# Patient Record
Sex: MALE | Race: OTHER | NOT HISPANIC OR LATINO | Employment: OTHER | URBAN - METROPOLITAN AREA
[De-identification: names, ages, dates, MRNs, and addresses within clinical notes are randomized per-mention and may not be internally consistent; named-entity substitution may affect disease eponyms.]

---

## 2017-01-01 ENCOUNTER — TELEPHONE (OUTPATIENT)
Dept: MEDICAL GROUP | Age: 82
End: 2017-01-01

## 2017-01-01 ENCOUNTER — HOSPITAL ENCOUNTER (OUTPATIENT)
Dept: LAB | Facility: MEDICAL CENTER | Age: 82
End: 2017-08-07
Attending: INTERNAL MEDICINE
Payer: MEDICARE

## 2017-01-01 ENCOUNTER — OFFICE VISIT (OUTPATIENT)
Dept: MEDICAL GROUP | Age: 82
End: 2017-01-01
Payer: MEDICARE

## 2017-01-01 ENCOUNTER — OFFICE VISIT (OUTPATIENT)
Dept: URGENT CARE | Facility: CLINIC | Age: 82
End: 2017-01-01
Payer: MEDICARE

## 2017-01-01 ENCOUNTER — HOSPITAL ENCOUNTER (OUTPATIENT)
Dept: RADIOLOGY | Facility: MEDICAL CENTER | Age: 82
End: 2017-11-16
Attending: INTERNAL MEDICINE
Payer: MEDICARE

## 2017-01-01 ENCOUNTER — HOSPITAL ENCOUNTER (OUTPATIENT)
Dept: LAB | Facility: MEDICAL CENTER | Age: 82
End: 2017-11-14
Attending: INTERNAL MEDICINE
Payer: MEDICARE

## 2017-01-01 ENCOUNTER — HOSPITAL ENCOUNTER (OUTPATIENT)
Dept: RADIOLOGY | Facility: MEDICAL CENTER | Age: 82
End: 2017-08-09
Attending: INTERNAL MEDICINE
Payer: MEDICARE

## 2017-01-01 VITALS
SYSTOLIC BLOOD PRESSURE: 150 MMHG | WEIGHT: 141.6 LBS | HEART RATE: 82 BPM | HEIGHT: 65 IN | TEMPERATURE: 98.1 F | DIASTOLIC BLOOD PRESSURE: 82 MMHG | OXYGEN SATURATION: 95 % | BODY MASS INDEX: 23.59 KG/M2

## 2017-01-01 VITALS
HEIGHT: 65 IN | RESPIRATION RATE: 18 BRPM | DIASTOLIC BLOOD PRESSURE: 72 MMHG | TEMPERATURE: 97.4 F | BODY MASS INDEX: 23.49 KG/M2 | WEIGHT: 141 LBS | OXYGEN SATURATION: 97 % | SYSTOLIC BLOOD PRESSURE: 126 MMHG | HEART RATE: 60 BPM

## 2017-01-01 VITALS
OXYGEN SATURATION: 95 % | HEIGHT: 65 IN | TEMPERATURE: 97.3 F | DIASTOLIC BLOOD PRESSURE: 78 MMHG | SYSTOLIC BLOOD PRESSURE: 130 MMHG | HEART RATE: 72 BPM | WEIGHT: 139.8 LBS | BODY MASS INDEX: 23.29 KG/M2

## 2017-01-01 DIAGNOSIS — K21.9 GASTROESOPHAGEAL REFLUX DISEASE WITHOUT ESOPHAGITIS: ICD-10-CM

## 2017-01-01 DIAGNOSIS — I10 HTN (HYPERTENSION), BENIGN: ICD-10-CM

## 2017-01-01 DIAGNOSIS — M15.9 PRIMARY OSTEOARTHRITIS INVOLVING MULTIPLE JOINTS: ICD-10-CM

## 2017-01-01 DIAGNOSIS — G25.81 RLS (RESTLESS LEGS SYNDROME): ICD-10-CM

## 2017-01-01 DIAGNOSIS — S99.921A INJURY OF TOE ON RIGHT FOOT, INITIAL ENCOUNTER: ICD-10-CM

## 2017-01-01 DIAGNOSIS — R63.4 WEIGHT LOSS: ICD-10-CM

## 2017-01-01 DIAGNOSIS — F02.80 DEMENTIA ASSOCIATED WITH OTHER UNDERLYING DISEASE WITHOUT BEHAVIORAL DISTURBANCE (HCC): ICD-10-CM

## 2017-01-01 DIAGNOSIS — E78.00 PURE HYPERCHOLESTEROLEMIA: ICD-10-CM

## 2017-01-01 DIAGNOSIS — C34.91 NON-SMALL CELL CARCINOMA OF LUNG, RIGHT (HCC): ICD-10-CM

## 2017-01-01 DIAGNOSIS — Z23 NEED FOR VACCINATION: ICD-10-CM

## 2017-01-01 DIAGNOSIS — C34.91 PRIMARY LUNG CANCER WITH METASTASIS FROM LUNG TO OTHER SITE, RIGHT (HCC): ICD-10-CM

## 2017-01-01 DIAGNOSIS — F01.50 VASCULAR DEMENTIA WITHOUT BEHAVIORAL DISTURBANCE (HCC): ICD-10-CM

## 2017-01-01 LAB
ALBUMIN SERPL BCP-MCNC: 3.9 G/DL (ref 3.2–4.9)
ALBUMIN SERPL BCP-MCNC: 4.1 G/DL (ref 3.2–4.9)
ALBUMIN/GLOB SERPL: 1.3 G/DL
ALBUMIN/GLOB SERPL: 1.3 G/DL
ALP SERPL-CCNC: 76 U/L (ref 30–99)
ALP SERPL-CCNC: 81 U/L (ref 30–99)
ALT SERPL-CCNC: 21 U/L (ref 2–50)
ALT SERPL-CCNC: 25 U/L (ref 2–50)
ANION GAP SERPL CALC-SCNC: 7 MMOL/L (ref 0–11.9)
ANION GAP SERPL CALC-SCNC: 9 MMOL/L (ref 0–11.9)
AST SERPL-CCNC: 23 U/L (ref 12–45)
AST SERPL-CCNC: 28 U/L (ref 12–45)
BASOPHILS # BLD AUTO: 0.4 % (ref 0–1.8)
BASOPHILS # BLD: 0.03 K/UL (ref 0–0.12)
BILIRUB SERPL-MCNC: 0.7 MG/DL (ref 0.1–1.5)
BILIRUB SERPL-MCNC: 0.9 MG/DL (ref 0.1–1.5)
BUN SERPL-MCNC: 25 MG/DL (ref 8–22)
BUN SERPL-MCNC: 27 MG/DL (ref 8–22)
CALCIUM SERPL-MCNC: 8.9 MG/DL (ref 8.5–10.5)
CALCIUM SERPL-MCNC: 9.5 MG/DL (ref 8.5–10.5)
CHLORIDE SERPL-SCNC: 100 MMOL/L (ref 96–112)
CHLORIDE SERPL-SCNC: 103 MMOL/L (ref 96–112)
CO2 SERPL-SCNC: 25 MMOL/L (ref 20–33)
CO2 SERPL-SCNC: 31 MMOL/L (ref 20–33)
CREAT SERPL-MCNC: 1.02 MG/DL (ref 0.5–1.4)
CREAT SERPL-MCNC: 1.1 MG/DL (ref 0.5–1.4)
EOSINOPHIL # BLD AUTO: 0.03 K/UL (ref 0–0.51)
EOSINOPHIL NFR BLD: 0.4 % (ref 0–6.9)
ERYTHROCYTE [DISTWIDTH] IN BLOOD BY AUTOMATED COUNT: 43.8 FL (ref 35.9–50)
GFR SERPL CREATININE-BSD FRML MDRD: >60 ML/MIN/1.73 M 2
GFR SERPL CREATININE-BSD FRML MDRD: >60 ML/MIN/1.73 M 2
GLOBULIN SER CALC-MCNC: 3.1 G/DL (ref 1.9–3.5)
GLOBULIN SER CALC-MCNC: 3.1 G/DL (ref 1.9–3.5)
GLUCOSE SERPL-MCNC: 120 MG/DL (ref 65–99)
GLUCOSE SERPL-MCNC: 133 MG/DL (ref 65–99)
HCT VFR BLD AUTO: 42.4 % (ref 42–52)
HGB BLD-MCNC: 13.5 G/DL (ref 14–18)
IMM GRANULOCYTES # BLD AUTO: 0.08 K/UL (ref 0–0.11)
IMM GRANULOCYTES NFR BLD AUTO: 1 % (ref 0–0.9)
LYMPHOCYTES # BLD AUTO: 1.62 K/UL (ref 1–4.8)
LYMPHOCYTES NFR BLD: 20.3 % (ref 22–41)
MCH RBC QN AUTO: 26.8 PG (ref 27–33)
MCHC RBC AUTO-ENTMCNC: 31.8 G/DL (ref 33.7–35.3)
MCV RBC AUTO: 84.3 FL (ref 81.4–97.8)
MONOCYTES # BLD AUTO: 0.86 K/UL (ref 0–0.85)
MONOCYTES NFR BLD AUTO: 10.8 % (ref 0–13.4)
NEUTROPHILS # BLD AUTO: 5.38 K/UL (ref 1.82–7.42)
NEUTROPHILS NFR BLD: 67.1 % (ref 44–72)
NRBC # BLD AUTO: 0 K/UL
NRBC BLD AUTO-RTO: 0 /100 WBC
PLATELET # BLD AUTO: 176 K/UL (ref 164–446)
PMV BLD AUTO: 12.3 FL (ref 9–12.9)
POTASSIUM SERPL-SCNC: 3.2 MMOL/L (ref 3.6–5.5)
POTASSIUM SERPL-SCNC: 3.6 MMOL/L (ref 3.6–5.5)
PROT SERPL-MCNC: 7 G/DL (ref 6–8.2)
PROT SERPL-MCNC: 7.2 G/DL (ref 6–8.2)
RBC # BLD AUTO: 5.03 M/UL (ref 4.7–6.1)
SODIUM SERPL-SCNC: 137 MMOL/L (ref 135–145)
SODIUM SERPL-SCNC: 138 MMOL/L (ref 135–145)
WBC # BLD AUTO: 8 K/UL (ref 4.8–10.8)

## 2017-01-01 PROCEDURE — 36415 COLL VENOUS BLD VENIPUNCTURE: CPT

## 2017-01-01 PROCEDURE — 80053 COMPREHEN METABOLIC PANEL: CPT

## 2017-01-01 PROCEDURE — G0008 ADMIN INFLUENZA VIRUS VAC: HCPCS | Performed by: FAMILY MEDICINE

## 2017-01-01 PROCEDURE — 700117 HCHG RX CONTRAST REV CODE 255: Performed by: INTERNAL MEDICINE

## 2017-01-01 PROCEDURE — 85025 COMPLETE CBC W/AUTO DIFF WBC: CPT

## 2017-01-01 PROCEDURE — 71260 CT THORAX DX C+: CPT

## 2017-01-01 PROCEDURE — 99214 OFFICE O/P EST MOD 30 MIN: CPT | Performed by: FAMILY MEDICINE

## 2017-01-01 PROCEDURE — 74160 CT ABDOMEN W/CONTRAST: CPT

## 2017-01-01 PROCEDURE — 90662 IIV NO PRSV INCREASED AG IM: CPT | Performed by: FAMILY MEDICINE

## 2017-01-01 PROCEDURE — 99214 OFFICE O/P EST MOD 30 MIN: CPT | Mod: 25 | Performed by: FAMILY MEDICINE

## 2017-01-01 RX ORDER — DILTIAZEM HYDROCHLORIDE 180 MG/1
180 CAPSULE, COATED, EXTENDED RELEASE ORAL DAILY
Qty: 90 CAP | Refills: 0 | Status: SHIPPED | OUTPATIENT
Start: 2017-01-01 | End: 2017-01-01 | Stop reason: SDUPTHER

## 2017-01-01 RX ORDER — DIAZEPAM 5 MG/1
TABLET ORAL
Qty: 30 TAB | Refills: 0 | Status: SHIPPED
Start: 2017-01-01 | End: 2017-01-01 | Stop reason: SDUPTHER

## 2017-01-01 RX ORDER — GABAPENTIN 300 MG/1
300 CAPSULE ORAL 3 TIMES DAILY
Qty: 270 CAP | Refills: 0 | Status: SHIPPED | OUTPATIENT
Start: 2017-01-01 | End: 2018-01-01 | Stop reason: SDUPTHER

## 2017-01-01 RX ORDER — PANTOPRAZOLE SODIUM 40 MG/1
TABLET, DELAYED RELEASE ORAL
Qty: 90 TAB | Refills: 3 | Status: SHIPPED | OUTPATIENT
Start: 2017-01-01

## 2017-01-01 RX ORDER — GABAPENTIN 300 MG/1
CAPSULE ORAL
Qty: 270 CAP | Refills: 0 | Status: SHIPPED | OUTPATIENT
Start: 2017-01-01 | End: 2017-01-01 | Stop reason: SDUPTHER

## 2017-01-01 RX ORDER — EZETIMIBE 10 MG/1
10 TABLET ORAL DAILY
Qty: 90 TAB | Refills: 0 | Status: SHIPPED | OUTPATIENT
Start: 2017-01-01 | End: 2017-01-01 | Stop reason: SDUPTHER

## 2017-01-01 RX ORDER — DILTIAZEM HYDROCHLORIDE 180 MG/1
CAPSULE, EXTENDED RELEASE ORAL
Qty: 90 CAP | Refills: 0 | Status: SHIPPED | OUTPATIENT
Start: 2017-01-01

## 2017-01-01 RX ORDER — HYDROCHLOROTHIAZIDE 12.5 MG/1
CAPSULE, GELATIN COATED ORAL
Qty: 90 CAP | Refills: 0 | Status: SHIPPED | OUTPATIENT
Start: 2017-01-01 | End: 2018-01-01 | Stop reason: SDUPTHER

## 2017-01-01 RX ORDER — MEMANTINE HYDROCHLORIDE 5 MG/1
5 TABLET ORAL DAILY
Qty: 90 TAB | Refills: 0 | Status: SHIPPED | OUTPATIENT
Start: 2017-01-01 | End: 2017-01-01 | Stop reason: SDUPTHER

## 2017-01-01 RX ORDER — POLYETHYLENE GLYCOL 3350 17 G/17G
POWDER, FOR SOLUTION ORAL
Qty: 90 EACH | Refills: 0 | Status: SHIPPED | OUTPATIENT
Start: 2017-01-01

## 2017-01-01 RX ORDER — PANTOPRAZOLE SODIUM 40 MG/1
TABLET, DELAYED RELEASE ORAL
Qty: 90 TAB | Refills: 3 | Status: SHIPPED | OUTPATIENT
Start: 2017-01-01 | End: 2017-01-01 | Stop reason: SDUPTHER

## 2017-01-01 RX ORDER — ROPINIROLE 1 MG/1
3 TABLET, FILM COATED ORAL
Qty: 270 TAB | Refills: 0 | Status: SHIPPED | OUTPATIENT
Start: 2017-01-01 | End: 2018-01-01 | Stop reason: SDUPTHER

## 2017-01-01 RX ORDER — FLUTICASONE PROPIONATE 50 MCG
SPRAY, SUSPENSION (ML) NASAL
Qty: 1 BOTTLE | Refills: 0 | Status: SHIPPED | OUTPATIENT
Start: 2017-01-01

## 2017-01-01 RX ORDER — EZETIMIBE 10 MG/1
10 TABLET ORAL DAILY
Qty: 90 TAB | Refills: 0 | Status: SHIPPED | OUTPATIENT
Start: 2017-01-01 | End: 2018-01-01 | Stop reason: SDUPTHER

## 2017-01-01 RX ORDER — DONEPEZIL HYDROCHLORIDE 5 MG/1
TABLET, FILM COATED ORAL
Qty: 90 TAB | Refills: 0 | Status: SHIPPED | OUTPATIENT
Start: 2017-01-01 | End: 2017-01-01

## 2017-01-01 RX ORDER — SULFAMETHOXAZOLE AND TRIMETHOPRIM 800; 160 MG/1; MG/1
1 TABLET ORAL EVERY 12 HOURS
Qty: 14 TAB | Refills: 0 | Status: SHIPPED | OUTPATIENT
Start: 2017-01-01 | End: 2017-01-01

## 2017-01-01 RX ORDER — POLYETHYLENE GLYCOL 3350 17 G/17G
POWDER, FOR SOLUTION ORAL
Qty: 90 EACH | Refills: 0 | Status: SHIPPED | OUTPATIENT
Start: 2017-01-01 | End: 2017-01-01 | Stop reason: SDUPTHER

## 2017-01-01 RX ORDER — DIAZEPAM 5 MG/1
TABLET ORAL
Qty: 30 TAB | Refills: 0 | Status: SHIPPED
Start: 2017-01-01 | End: 2018-01-01 | Stop reason: SDUPTHER

## 2017-01-01 RX ORDER — MEMANTINE HYDROCHLORIDE 5 MG/1
5 TABLET ORAL DAILY
Qty: 90 TAB | Refills: 0 | Status: SHIPPED | OUTPATIENT
Start: 2017-01-01 | End: 2018-01-01 | Stop reason: SDUPTHER

## 2017-01-01 RX ADMIN — IOHEXOL 100 ML: 350 INJECTION, SOLUTION INTRAVENOUS at 15:14

## 2017-01-01 RX ADMIN — IOHEXOL 100 ML: 350 INJECTION, SOLUTION INTRAVENOUS at 13:45

## 2017-01-01 RX ADMIN — IOHEXOL 50 ML: 240 INJECTION, SOLUTION INTRATHECAL; INTRAVASCULAR; INTRAVENOUS; ORAL at 13:45

## 2017-01-01 ASSESSMENT — ENCOUNTER SYMPTOMS
FOCAL WEAKNESS: 0
FEVER: 0
CHILLS: 0
DIZZINESS: 0

## 2017-01-04 ENCOUNTER — HOSPITAL ENCOUNTER (OUTPATIENT)
Dept: LAB | Facility: MEDICAL CENTER | Age: 82
End: 2017-01-04
Attending: FAMILY MEDICINE
Payer: MEDICARE

## 2017-01-04 DIAGNOSIS — D64.9 ANEMIA, UNSPECIFIED TYPE: ICD-10-CM

## 2017-01-04 LAB
ALBUMIN SERPL BCP-MCNC: 4.2 G/DL (ref 3.2–4.9)
ALBUMIN/GLOB SERPL: 1.3 G/DL
ALP SERPL-CCNC: 79 U/L (ref 30–99)
ALT SERPL-CCNC: 17 U/L (ref 2–50)
ANION GAP SERPL CALC-SCNC: 11 MMOL/L (ref 0–11.9)
AST SERPL-CCNC: 22 U/L (ref 12–45)
BILIRUB SERPL-MCNC: 1.1 MG/DL (ref 0.1–1.5)
BUN SERPL-MCNC: 26 MG/DL (ref 8–22)
CALCIUM SERPL-MCNC: 9.5 MG/DL (ref 8.5–10.5)
CHLORIDE SERPL-SCNC: 100 MMOL/L (ref 96–112)
CO2 SERPL-SCNC: 28 MMOL/L (ref 20–33)
CREAT SERPL-MCNC: 1.14 MG/DL (ref 0.5–1.4)
GLOBULIN SER CALC-MCNC: 3.2 G/DL (ref 1.9–3.5)
GLUCOSE SERPL-MCNC: 89 MG/DL (ref 65–99)
POTASSIUM SERPL-SCNC: 3.8 MMOL/L (ref 3.6–5.5)
PROT SERPL-MCNC: 7.4 G/DL (ref 6–8.2)
SODIUM SERPL-SCNC: 139 MMOL/L (ref 135–145)

## 2017-01-04 PROCEDURE — 36415 COLL VENOUS BLD VENIPUNCTURE: CPT

## 2017-01-04 PROCEDURE — 80053 COMPREHEN METABOLIC PANEL: CPT

## 2017-01-10 ENCOUNTER — OFFICE VISIT (OUTPATIENT)
Dept: MEDICAL GROUP | Age: 82
End: 2017-01-10
Payer: MEDICARE

## 2017-01-10 VITALS
OXYGEN SATURATION: 93 % | BODY MASS INDEX: 23.69 KG/M2 | WEIGHT: 142.2 LBS | TEMPERATURE: 97.5 F | DIASTOLIC BLOOD PRESSURE: 72 MMHG | SYSTOLIC BLOOD PRESSURE: 130 MMHG | HEART RATE: 83 BPM | HEIGHT: 65 IN

## 2017-01-10 DIAGNOSIS — F02.80 DEMENTIA ASSOCIATED WITH OTHER UNDERLYING DISEASE WITHOUT BEHAVIORAL DISTURBANCE (HCC): ICD-10-CM

## 2017-01-10 DIAGNOSIS — R25.2 CRAMP IN LOWER LEG: ICD-10-CM

## 2017-01-10 DIAGNOSIS — R05.9 COUGH: ICD-10-CM

## 2017-01-10 DIAGNOSIS — G31.84 MILD COGNITIVE IMPAIRMENT: ICD-10-CM

## 2017-01-10 DIAGNOSIS — I10 HTN (HYPERTENSION), BENIGN: ICD-10-CM

## 2017-01-10 DIAGNOSIS — R20.2 PARESTHESIAS: ICD-10-CM

## 2017-01-10 DIAGNOSIS — G25.81 RLS (RESTLESS LEGS SYNDROME): ICD-10-CM

## 2017-01-10 DIAGNOSIS — K21.9 GASTROESOPHAGEAL REFLUX DISEASE WITHOUT ESOPHAGITIS: ICD-10-CM

## 2017-01-10 PROCEDURE — 99214 OFFICE O/P EST MOD 30 MIN: CPT | Performed by: FAMILY MEDICINE

## 2017-01-10 NOTE — ASSESSMENT & PLAN NOTE
Patient has been stable on Protonix 40 mg by mouth daily. Patient denies any difficulty swallowing, no regurgitation, no eructation, no weight loss, no nocturnal asthma no food getting stuck in the chest.

## 2017-01-10 NOTE — ASSESSMENT & PLAN NOTE
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 His daughter states that he tends to just forget things all the time but it's not too severe.

## 2017-01-10 NOTE — TELEPHONE ENCOUNTER
Was the patient seen in the last year in this department? Yes     Does patient have an active prescription for medications requested? No     Received Request Via: Patient       Patient taking ranitidine 150 mg and tria/hctz 37.5-25mg but they are not on med list.

## 2017-01-10 NOTE — ASSESSMENT & PLAN NOTE
The patient states he's been coughing for about a week now, there is been no sputum production. Denies any generalized malaise, no changes in appetite no headaches no nasal congestion, no runny nose. He is able to tolerate by mouth.

## 2017-01-10 NOTE — ASSESSMENT & PLAN NOTE
Patient has been taking hydrochlorothiazide 12.5 mg, diltiazem 180 mg and tolerating medications just fine.The patient has been tollerating the BP meds without any issues. No tunnel vision, no cough, no changes in vision, no lightheadedness, no fatigue, no syncopal or presyncopal episodes, no edema, no new rashes.

## 2017-01-10 NOTE — PROGRESS NOTES
This medical record contains text that has been entered with the assistance of computer voice recognition and dictation software.  Therefore, it may contain unintended errors in text, spelling, punctuation, or grammar    Chief Complaint   Patient presents with   • Cough     x 2-3 wks   • Hypertension     med refill all meds/leaving to [facility] Feb 7       Benjie Gardiner is a 85 y.o. 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 His daughter states that he tends to just forget things all the time but it's not too severe.    HTN (hypertension), benign  Patient has been taking hydrochlorothiazide 12.5 mg, diltiazem 180 mg and tolerating medications just fine.The patient has been tollerating the BP meds without any issues. No tunnel vision, no cough, no changes in vision, no lightheadedness, no fatigue, no syncopal or presyncopal episodes, no edema, no new rashes.     Gastroesophageal reflux disease without esophagitis  Patient has been stable on Protonix 40 mg by mouth daily. Patient denies any difficulty swallowing, no regurgitation, no eructation, no weight loss, no nocturnal asthma no food getting stuck in the chest.    Cough  The patient states he's been coughing for about a week now, there is been no sputum production. Denies any generalized malaise, no changes in appetite no headaches no nasal congestion, no runny nose. He is able to tolerate by mouth.      Current medicines (including changes today)  Current Outpatient Prescriptions   Medication Sig Dispense Refill   • pantoprazole (PROTONIX) 40 MG Tablet Delayed Response TAKE ONE TABLET BY MOUTH ONCE DAILY 90 Tab 0   • polyethylene glycol/lytes (MIRALAX) Pack DISSOLVE ONE PACKET IN WATER AND DRINK ONCE DAILY AS NEEDED FOR CONSTIPATION 28 Each 0   • diltiazem (DILACOR XR) 180 MG XR capsule TAKE ONE CAPSULE  BY MOUTH ONCE DAILY 30 Cap 0   • albuterol 108 (90 BASE) MCG/ACT Aero Soln inhalation aerosol Inhale 2 Puffs by mouth every 6 hours as needed for Shortness of Breath. 8.5 g 3   • fluticasone (FLONASE) 50 MCG/ACT nasal spray Spray 2 Sprays in nose 2 times a day. 1 Bottle 3   • benzonatate (TESSALON) 100 MG Cap Take 1 Cap by mouth 3 times a day as needed for Cough. 60 Cap 3   • donepezil (ARICEPT) 5 MG Tab Take 1 Tab by mouth every day. 30 Tab 0   • chlorhexidine (PERIDEX) 0.12 % Solution Take 15 mL by mouth 2 times a day. 946 mL 0   • Esomeprazole Magnesium (NEXIUM 24HR) 20 MG Tablet Delayed Response Take  by mouth.     • clotrimazole-betamethasone (LOTRISONE) 1-0.05 % Cream Apply 1 Application to affected area(s) 2 times a day.     • doxycycline (VIBRAMYCIN) 100 MG Tab Take 100 mg by mouth 2 times a day. Continuous.     • Erlotinib HCl (TARCEVA) 100 MG Tab Take 100 mg by mouth every evening.     • tamsulosin (FLOMAX) 0.4 MG capsule Take 0.4 mg by mouth ONE-HALF HOUR AFTER BREAKFAST.     • ezetimibe (ZETIA) 10 MG Tab Take 10 mg by mouth every day.     • diltiazem CD (CARDIZEM CD) 180 MG CAPSULE SR 24 HR Take 180 mg by mouth every day.     • hydrochlorothiazide (MICROZIDE) 12.5 MG capsule Take 12.5 mg by mouth every day.     • celecoxib (CELEBREX) 200 MG Cap Take 200 mg by mouth 1 time daily as needed for Moderate Pain.     • clindamycin (CLEOCIN T) 1 % Gel Apply 1 Application to affected area(s) 2 times a day.     • ferrous sulfate 325 (65 FE) MG tablet Take 325 mg by mouth every day.     • ropinirole (REQUIP) 1 MG Tab Take 3 Tabs by mouth every bedtime. 270 Tab 3   • latanoprost (XALATAN) 0.005 % Solution Place 1 Drop in both eyes every evening.     • gabapentin (NEURONTIN) 300 MG Cap Take 1 Cap by mouth 3 times a day. 270 Cap 0   • pantoprazole (PROTONIX) 40 MG Tablet Delayed Response Take 1 Tab by mouth every day. 90 Tab 0   • diazepam (VALIUM) 5 MG Tab Take 0.5 Tabs by mouth every 6 hours as needed (muscle  cramps). 20 Tab 0   • hydrocodone-acetaminophen (NORCO) 5-325 MG Tab per tablet Take 1-2 Tabs by mouth every four hours as needed. Indications: Moderate to Moderately Severe Pain     • Multiple Vitamins-Minerals (MULTIVITAMIN PO) Take 1 Tab by mouth every day.     • vitamin D (CHOLECALCIFEROL) 1000 UNIT TABS Take 1,000 Units by mouth every day.       No current facility-administered medications for this visit.     He  has a past medical history of HTN (hypertension), benign; Mild cognitive impairment; Hyperlipidemia; Paresthesias; Depression; Anxiety; DJD (degenerative joint disease); MEDICAL HOME (2012); Hypertension; Heart burn; Indigestion; Glaucoma; Unspecified cataract; Dementia; Arthritis; Carcinoma in situ of respiratory system; and Cancer (HCC) ().  He  has past surgical history that includes cataract extraction (Bilateral, ); other (Left, ); hemorrhoidectomy (08); prostatectomy, radial (); prostatectomy, radical retro; recovery (3/13/2015); colonoscopy - endo (N/A, 2016); and penetrating keratoplasty (Left, 2016).  Social History   Substance Use Topics   • Smoking status: Never Smoker    • Smokeless tobacco: Never Used   • Alcohol Use: No     Social History     Social History Narrative    No known exposure to asbestos, dyes, chemicals, or pesticides.   for 53 years.  Moved to  in .  2 daughters and 1 son.  1 daughter passed away from breast cancer.      Family History   Problem Relation Age of Onset   • Cancer Daughter 49     Breast     Family Status   Relation Status Death Age   • Daughter  50   • Mother     • Father     • Sister     • Maternal Grandmother     • Maternal Grandfather     • Paternal Grandmother     • Paternal Grandfather           ROS  Please see hpi    All other systems reviewed and are negative     Objective:     Blood pressure 130/72, pulse 83, temperature 36.4 °C (97.5 °F),  "height 1.651 m (5' 5\"), weight 64.501 kg (142 lb 3.2 oz), SpO2 93 %. Body mass index is 23.66 kg/(m^2).  Physical Exam:    Constitutional: Alert, no distress.  Skin: Warm, dry, good turgor, no rashes in visible areas.  Eye: Equal, round and reactive, conjunctiva clear, lids normal.  ENMT: Lips without lesions, good dentition, oropharynx clear.  Neck: Trachea midline, no masses, no thyromegaly. No cervical or supraclavicular lymphadenopathy.  Respiratory: Unlabored respiratory effort, lungs clear to auscultation, no wheezes, no ronchi.  Cardiovascular: Normal S1, S2, no murmur, no edema.  Abdomen: Soft, non-tender, no masses, no hepatosplenomegaly.  Psych: Alert and oriented x3, normal affect and mood.          Assessment and Plan:   The following treatment plan was discussed, again this medical record contains text that has been entered with the assistance of computer voice recognition and dictation software.  Therefore, it may contain unintended errors in text, spelling, punctuation, or grammar    1. Cough    The patient is nontoxic in appearance  Also hemodynamically stable  There is no clinical indication for antibiotics or imaging  We will proceed with conservative management      2. Dementia associated with other underlying disease without behavioral disturbance  Patient has been stable with current management  We will make no changes for now      3. HTN (hypertension), benign  Patient has been stable with current management  We will make no changes for now      4. Gastroesophageal reflux disease without esophagitis  Patient has been stable with current management  We will make no changes for now          Followup: Return in about 3 months (around 4/10/2017) for Reevaluation.           "

## 2017-01-10 NOTE — MR AVS SNAPSHOT
"        Benjie Gardiner   1/10/2017 9:40 AM   Office Visit   MRN: [id number]    Department:  [address] [place] [address]   Dept Phone:  [phone]    Description:  Male : 3/1/1931   Provider:  Asher Garza M.D.           Reason for Visit     Cough x 2-3 wks    Hypertension med refill all meds/leaving to [facility]       Allergies as of 1/10/2017     Allergen Noted Reactions    Lipitor [Atorvastatin Calcium] 2009       Leg cramps      Vital Signs     Blood Pressure Pulse Temperature Height Weight Body Mass Index    130/72 mmHg 83 36.4 °C (97.5 °F) 1.651 m (5' 5\") 64.501 kg (142 lb 3.2 oz) 23.66 kg/m2    Oxygen Saturation Smoking Status                93% Never Smoker           Basic Information     Date Of Birth Sex Race Ethnicity Preferred Language    3/1/1931 Male Other Non- English      Your appointments     2017  1:00 PM   CT BODY WITH with [facility] CT 2   [facility] [place]    [address]  [facility] [address]   [phone]           Taking medications as regularly scheduled is strongly encouraged.  *For Abdominal CT-Patient needs to  oral contrast and instruction from the department at least 2 hours prior to exam. Patient may  contrast at any imaging facility.              Problem List              ICD-10-CM Priority Class Noted - Resolved    HTN (hypertension), benign I10   Unknown - Present    Mild cognitive impairment G31.84   Unknown - Present    Hyperlipidemia E78.5   Unknown - Present    DJD (degenerative joint disease) M19.90   Unknown - Present    Paresthesias R20.2   Unknown - Present    Pulmonary nodule R91.1   2015 - Present    RLS (restless legs syndrome) G25.81   3/2/2015 - Present    Solitary pulmonary nodule R91.1   3/13/2015 - Present    Lung cancer (HCC) C34.90   2015 - Present    Primary pulmonary hypertension (HCC) I27.0 High  2015 - Present    Gastroesophageal reflux disease without esophagitis " K21.9   12/2/2015 - Present    Chronic rhinitis J31.0   12/2/2015 - Present    Weight loss R63.4   12/2/2015 - Present    Erosive gastritis K29.00   12/10/2015 - Present    Cramp in lower leg R25.2   2/24/2016 - Present    Normocytic anemia D64.9   2/24/2016 - Present    Secondary corneal edema H18.239   4/5/2016 - Present    Left hip pain M25.552   6/27/2016 - Present    Seborrheic dermatitis of scalp L21.9   6/27/2016 - Present    Rash on both feet R21   6/27/2016 - Present    GIB (gastrointestinal bleeding) K92.2   9/8/2016 - Present    SBO (small bowel obstruction) ([facility]) K56.69   9/9/2016 - Present    Dementia F03.90   9/10/2016 - Present    Anemia D64.9   9/22/2016 - Present    Hypocalcemia E83.51   9/22/2016 - Present    ZANDER (acute kidney injury) ([facility]) N17.9   9/22/2016 - Present    Acute nasopharyngitis J00   12/2/2016 - Present      Health Maintenance        Date Due Completion Dates    DIABETES MONOFILAMTENT / LE EXAM 9/1/1931 ---    URINE ACR / MICROALBUMIN 10/29/2014 10/29/2013    A1C SCREENING 12/16/2015 6/16/2015, 12/20/2011    FASTING LIPID PROFILE 1/16/2017 1/16/2016, 9/8/2015, 6/4/2015, 8/26/2014, 8/15/2013, 1/17/2013, 7/2/2012, 10/13/2011, 3/4/2011, 8/25/2010, 3/4/2010, 9/12/2009    RETINAL SCREENING 8/15/2017 8/15/2016    SERUM CREATININE 1/4/2018 1/4/2017, 9/11/2016, 9/10/2016, 9/9/2016, 9/8/2016, 7/21/2016, 6/17/2016, 3/9/2016, 2/18/2016, 2/17/2016, 2/1/2016, 1/27/2016, 1/26/2016, 1/25/2016, 1/16/2016, 9/8/2015, 6/10/2015, 5/13/2015, 5/6/2015, 4/22/2015, 4/14/2015, 4/12/2015, 3/27/2015, 2/20/2015, 10/2/2014, 8/26/2014, 6/6/2014, 5/1/2014, 4/8/2014, 10/29/2013, 8/15/2013, 1/17/2013, 7/2/2012, 9/13/2011, 3/4/2010, 5/1/2008, 6/11/2007    IMM DTaP/Tdap/Td Vaccine (2 - Td) 10/8/2025 10/8/2015            Current Immunizations     13-VALENT PCV PREVNAR 10/8/2015    Influenza TIV (IM) 9/7/2010    Influenza Vaccine Adult HD 10/24/2016, 10/8/2015    Influenza Vaccine Quad Inj (Pf) 8/25/2014, 10/20/2011     Influenza Vaccine Quad Inj (Preserved) 10/8/2013, 9/20/2012    Pneumococcal polysaccharide vaccine (PPSV-23) 9/20/2012    SHINGLES VACCINE 10/8/2015    Tdap Vaccine 10/8/2015      Below and/or attached are the medications your provider expects you to take. Review all of your home medications and newly ordered medications with your provider and/or pharmacist. Follow medication instructions as directed by your provider and/or pharmacist. Please keep your medication list with you and share with your provider. Update the information when medications are discontinued, doses are changed, or new medications (including over-the-counter products) are added; and carry medication information at all times in the event of emergency situations     Allergies:  LIPITOR - (reactions not documented)               Medications  Valid as of: January 10, 2017 - 12:12 PM    Generic Name Brand Name Tablet Size Instructions for use    Albuterol Sulfate (Aero Soln) albuterol 108 (90 BASE) MCG/ACT Inhale 2 Puffs by mouth every 6 hours as needed for Shortness of Breath.        Benzonatate (Cap) TESSALON 100 MG Take 1 Cap by mouth 3 times a day as needed for Cough.        Celecoxib (Cap) CELEBREX 200 MG Take 200 mg by mouth 1 time daily as needed for Moderate Pain.        Chlorhexidine Gluconate (Solution) PERIDEX 0.12 % Take 15 mL by mouth 2 times a day.        Cholecalciferol (Tab) cholecalciferol 1000 UNIT Take 1,000 Units by mouth every day.        Clindamycin Phosphate (Gel) CLEOCIN T 1 % Apply 1 Application to affected area(s) 2 times a day.        Clotrimazole-Betamethasone (Cream) LOTRISONE 1-0.05 % Apply 1 Application to affected area(s) 2 times a day.        DiazePAM (Tab) VALIUM 5 MG Take 0.5 Tabs by mouth every 6 hours as needed (muscle cramps).        DiltiaZEM HCl (CAPSULE SR 24 HR) DILACOR  MG TAKE ONE CAPSULE BY MOUTH ONCE DAILY        DiltiaZEM HCl Coated Beads (CAPSULE SR 24 HR) CARDIZEM  MG Take 180 mg by mouth  every day.        Donepezil HCl (Tab) ARICEPT 5 MG Take 1 Tab by mouth every day.        Doxycycline Hyclate (Tab) VIBRAMYCIN 100 MG Take 100 mg by mouth 2 times a day. Continuous.        Erlotinib HCl (Tab) Erlotinib HCl 100 MG Take 100 mg by mouth every evening.        Esomeprazole Magnesium (Tablet Delayed Response) Esomeprazole Magnesium 20 MG Take  by mouth.        Ezetimibe (Tab) ZETIA 10 MG Take 10 mg by mouth every day.        Ferrous Sulfate (Tab) ferrous sulfate 325 (65 FE) MG Take 325 mg by mouth every day.        Fluticasone Propionate (Suspension) FLONASE 50 MCG/ACT Spray 2 Sprays in nose 2 times a day.        Gabapentin (Cap) NEURONTIN 300 MG Take 1 Cap by mouth 3 times a day.        HydroCHLOROthiazide (Cap) MICROZIDE 12.5 MG Take 12.5 mg by mouth every day.        Hydrocodone-Acetaminophen (Tab) NORCO 5-325 MG Take 1-2 Tabs by mouth every four hours as needed. Indications: Moderate to Moderately Severe Pain        Latanoprost (Solution) XALATAN 0.005 % Place 1 Drop in both eyes every evening.        Multiple Vitamins-Minerals   Take 1 Tab by mouth every day.        Pantoprazole Sodium (Tablet Delayed Response) PROTONIX 40 MG Take 1 Tab by mouth every day.        Pantoprazole Sodium (Tablet Delayed Response) PROTONIX 40 MG TAKE ONE TABLET BY MOUTH ONCE DAILY        Polyethylene Glycol 3350 (Pack) MIRALAX  DISSOLVE ONE PACKET IN WATER AND DRINK ONCE DAILY AS NEEDED FOR CONSTIPATION        ROPINIRole HCl (Tab) REQUIP 1 MG Take 3 Tabs by mouth every bedtime.        Tamsulosin HCl (Cap) FLOMAX 0.4 MG Take 0.4 mg by mouth ONE-HALF HOUR AFTER BREAKFAST.        .                 Medicines prescribed today were sent to:     [facility] [place], [place] - [address]    [address] [facility] [address]    Phone: [phone] Fax: [phone]    Open 24 Hours?: No      Medication refill instructions:       If your prescription bottle indicates you have medication refills left, it is not  necessary to call your provider’s office. Please contact your pharmacy and they will refill your medication.    If your prescription bottle indicates you do not have any refills left, you may request refills at any time through one of the following ways: The online [employer] system (except Urgent Care), by calling your provider’s office, or by asking your pharmacy to contact your provider’s office with a refill request. Medication refills are processed only during regular business hours and may not be available until the next business day. Your provider may request additional information or to have a follow-up visit with you prior to refilling your medication.   *Please Note: Medication refills are assigned a new Rx number when refilled electronically. Your pharmacy may indicate that no refills were authorized even though a new prescription for the same medication is available at the pharmacy. Please request the medicine by name with the pharmacy before contacting your provider for a refill.        Other Notes About Your Plan     This patient has an appointment on 03/14/2016. At the beginning of the year all chronic conditions should be assessed and Documented in conjunction with any other identified concerns during the visit.     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

## 2017-01-11 ENCOUNTER — HOSPITAL ENCOUNTER (OUTPATIENT)
Dept: RADIOLOGY | Facility: MEDICAL CENTER | Age: 82
End: 2017-01-11
Attending: INTERNAL MEDICINE
Payer: MEDICARE

## 2017-01-11 ENCOUNTER — TELEPHONE (OUTPATIENT)
Dept: MEDICAL GROUP | Age: 82
End: 2017-01-11

## 2017-01-11 DIAGNOSIS — C34.91 NON-SMALL CELL CARCINOMA OF LUNG, RIGHT (HCC): ICD-10-CM

## 2017-01-11 PROCEDURE — 71260 CT THORAX DX C+: CPT

## 2017-01-11 PROCEDURE — 700117 HCHG RX CONTRAST REV CODE 255: Performed by: INTERNAL MEDICINE

## 2017-01-11 RX ORDER — DONEPEZIL HYDROCHLORIDE 5 MG/1
5 TABLET, FILM COATED ORAL DAILY
Qty: 90 TAB | Refills: 0 | Status: SHIPPED | OUTPATIENT
Start: 2017-01-11 | End: 2017-04-18 | Stop reason: SDUPTHER

## 2017-01-11 RX ORDER — POLYETHYLENE GLYCOL 3350 17 G/17G
POWDER, FOR SOLUTION ORAL
Qty: 90 EACH | Refills: 0 | Status: SHIPPED | OUTPATIENT
Start: 2017-01-11 | End: 2017-05-19 | Stop reason: SDUPTHER

## 2017-01-11 RX ORDER — DIAZEPAM 5 MG/1
2.5 TABLET ORAL EVERY 6 HOURS PRN
Qty: 60 TAB | Refills: 0 | Status: SHIPPED
Start: 2017-01-11 | End: 2017-01-01 | Stop reason: SDUPTHER

## 2017-01-11 RX ORDER — GABAPENTIN 300 MG/1
300 CAPSULE ORAL 3 TIMES DAILY
Qty: 270 CAP | Refills: 0 | Status: SHIPPED | OUTPATIENT
Start: 2017-01-11 | End: 2017-04-19 | Stop reason: SDUPTHER

## 2017-01-11 RX ORDER — EZETIMIBE 10 MG/1
10 TABLET ORAL DAILY
Qty: 90 TAB | Refills: 0 | Status: SHIPPED | OUTPATIENT
Start: 2017-01-11 | End: 2017-01-01 | Stop reason: SDUPTHER

## 2017-01-11 RX ORDER — PANTOPRAZOLE SODIUM 40 MG/1
TABLET, DELAYED RELEASE ORAL
Qty: 90 TAB | Refills: 0 | Status: SHIPPED | OUTPATIENT
Start: 2017-01-11 | End: 2017-01-01 | Stop reason: SDUPTHER

## 2017-01-11 RX ORDER — ROPINIROLE 1 MG/1
3 TABLET, FILM COATED ORAL
Qty: 270 TAB | Refills: 0 | Status: SHIPPED | OUTPATIENT
Start: 2017-01-11 | End: 2017-01-01 | Stop reason: SDUPTHER

## 2017-01-11 RX ORDER — HYDROCHLOROTHIAZIDE 12.5 MG/1
12.5 CAPSULE, GELATIN COATED ORAL DAILY
Qty: 90 CAP | Refills: 0 | Status: SHIPPED | OUTPATIENT
Start: 2017-01-11 | End: 2017-04-18 | Stop reason: SDUPTHER

## 2017-01-11 RX ADMIN — IOHEXOL 150 ML: 350 INJECTION, SOLUTION INTRAVENOUS at 13:34

## 2017-01-11 NOTE — TELEPHONE ENCOUNTER
Refill done.    Jose Alberto Garza MD  Diplomat, [facility]  [address] [place] [phone]

## 2017-01-11 NOTE — TELEPHONE ENCOUNTER
Patient daughter would like to know if patient should be taking doxycycline, also, for his trip to [place].  Please advise.

## 2017-01-30 ENCOUNTER — RX ONLY (OUTPATIENT)
Age: 82
Setting detail: RX ONLY
End: 2017-01-30

## 2017-02-01 NOTE — TELEPHONE ENCOUNTER
Phone Number Called: [phone] Pt's daughter, Luz Elena    Message: Pt has been notified of Dr. Garza's message.    Left Message for patient to call back: no

## 2017-02-01 NOTE — TELEPHONE ENCOUNTER
Yes he should. They were given enough meds for both.    Jose Alberto Garza MD  [address] [address]

## 2017-04-17 ENCOUNTER — OFFICE VISIT (OUTPATIENT)
Dept: MEDICAL GROUP | Age: 82
End: 2017-04-17
Payer: MEDICARE

## 2017-04-17 VITALS
WEIGHT: 144.8 LBS | HEIGHT: 65 IN | SYSTOLIC BLOOD PRESSURE: 140 MMHG | DIASTOLIC BLOOD PRESSURE: 78 MMHG | BODY MASS INDEX: 24.12 KG/M2 | TEMPERATURE: 96.8 F | OXYGEN SATURATION: 99 % | HEART RATE: 87 BPM

## 2017-04-17 DIAGNOSIS — J40 BRONCHITIS: ICD-10-CM

## 2017-04-17 DIAGNOSIS — C34.00 MALIGNANT NEOPLASM OF HILUS OF LUNG, UNSPECIFIED LATERALITY (HCC): ICD-10-CM

## 2017-04-17 DIAGNOSIS — Z00.00 PREVENTATIVE HEALTH CARE: ICD-10-CM

## 2017-04-17 PROCEDURE — 99214 OFFICE O/P EST MOD 30 MIN: CPT | Performed by: FAMILY MEDICINE

## 2017-04-17 RX ORDER — CLOBETASOL PROPIONATE 0.5 MG/G
CREAM TOPICAL
COMMUNITY
Start: 2017-01-30

## 2017-04-17 RX ORDER — AZITHROMYCIN 250 MG/1
TABLET, FILM COATED ORAL
Qty: 6 TAB | Refills: 0 | Status: SHIPPED | OUTPATIENT
Start: 2017-04-17 | End: 2017-01-01

## 2017-04-17 RX ORDER — TIMOLOL MALEATE 5 MG/ML
SOLUTION/ DROPS OPHTHALMIC
COMMUNITY
Start: 2017-04-16

## 2017-04-17 RX ORDER — ERLOTINIB HYDROCHLORIDE 25 MG/1
TABLET ORAL
COMMUNITY
Start: 2017-01-19

## 2017-04-17 ASSESSMENT — PATIENT HEALTH QUESTIONNAIRE - PHQ9: CLINICAL INTERPRETATION OF PHQ2 SCORE: 0

## 2017-04-17 NOTE — ASSESSMENT & PLAN NOTE
Patient is a very pleasant male who always presents to clinic with his wife and daughter. he is up-to-date on health maintenance practices.

## 2017-04-17 NOTE — ASSESSMENT & PLAN NOTE
The patient states for the past 2 weeks she's been coughing up greenish sputum, having generalized malaise as well as runny nose stuffy frontal sinuses. He's been using over-the-counter remedies with no improvement. He did recently return from [place] about 3 weeks ago. Denies any neck pain no neck stiffness no change in vision no headaches no new rashes, no new joint pain, he is able to tolerate by mouth.

## 2017-04-17 NOTE — ASSESSMENT & PLAN NOTE
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 Franklin next wk

## 2017-04-17 NOTE — MR AVS SNAPSHOT
"        Benjie Rigoberto Gardiner   2017 10:00 AM   Office Visit   MRN: [id number]    Department:  [address] [place] [address]   Dept Phone:  [phone]    Description:  Male : 3/1/1931   Provider:  Asher Garza M.D.           Allergies as of 2017     Allergen Noted Reactions    Lipitor [Atorvastatin Calcium] 2009       Leg cramps      You were diagnosed with     Bronchitis   [107544]       Malignant neoplasm of hilus of lung, unspecified laterality (CMS-HCC)   [332140]       Preventative health care   [759083]         Vital Signs     Blood Pressure Pulse Temperature Height Weight Body Mass Index    140/78 mmHg 87 36 °C (96.8 °F) 1.651 m (5' 5\") 65.681 kg (144 lb 12.8 oz) 24.10 kg/m2    Oxygen Saturation Smoking Status                99% Never Smoker           Basic Information     Date Of Birth Sex Race Ethnicity Preferred Language    3/1/1931 Male Other Non- English      Problem List              ICD-10-CM Priority Class Noted - Resolved    HTN (hypertension), benign I10   Unknown - Present    Mild cognitive impairment G31.84   Unknown - Present    Hyperlipidemia E78.5   Unknown - Present    DJD (degenerative joint disease) M19.90   Unknown - Present    Paresthesias R20.2   Unknown - Present    Pulmonary nodule R91.1   2015 - Present    RLS (restless legs syndrome) G25.81   3/2/2015 - Present    Solitary pulmonary nodule R91.1   3/13/2015 - Present    Lung cancer (CMS-HCC) C34.90   2015 - Present    Primary pulmonary hypertension (CMS-HCC) I27.0 High  2015 - Present    Gastroesophageal reflux disease without esophagitis K21.9   2015 - Present    Chronic rhinitis J31.0   2015 - Present    Weight loss R63.4   2015 - Present    Erosive gastritis K29.00   12/10/2015 - Present    Cramp in lower leg R25.2   2016 - Present    Normocytic anemia D64.9   2016 - Present    Secondary corneal edema H18.239   2016 - Present    Left hip pain M25.552   2016 " - Present    Seborrheic dermatitis of scalp L21.9   6/27/2016 - Present    Rash on both feet R21   6/27/2016 - Present    GIB (gastrointestinal bleeding) K92.2   9/8/2016 - Present    SBO (small bowel obstruction) (CMS-HCC) K56.69   9/9/2016 - Present    Dementia F03.90   9/10/2016 - Present    Anemia D64.9   9/22/2016 - Present    Hypocalcemia E83.51   9/22/2016 - Present    ZANDER (acute kidney injury) (CMS-HCC) N17.9   9/22/2016 - Present    Acute nasopharyngitis J00   12/2/2016 - Present    Cough R05   1/10/2017 - Present    Bronchitis J40   4/17/2017 - Present    Preventative health care Z00.00   4/17/2017 - Present      Health Maintenance        Date Due Completion Dates    IMM DTaP/Tdap/Td Vaccine (2 - Td) 10/8/2025 10/8/2015            Current Immunizations     13-VALENT PCV PREVNAR 10/8/2015    Influenza TIV (IM) 9/7/2010    Influenza Vaccine Adult HD 10/24/2016, 10/8/2015    Influenza Vaccine Quad Inj (Pf) 8/25/2014, 10/20/2011    Influenza Vaccine Quad Inj (Preserved) 10/8/2013, 9/20/2012    Pneumococcal polysaccharide vaccine (PPSV-23) 9/20/2012    SHINGLES VACCINE 10/8/2015    Tdap Vaccine 10/8/2015      Below and/or attached are the medications your provider expects you to take. Review all of your home medications and newly ordered medications with your provider and/or pharmacist. Follow medication instructions as directed by your provider and/or pharmacist. Please keep your medication list with you and share with your provider. Update the information when medications are discontinued, doses are changed, or new medications (including over-the-counter products) are added; and carry medication information at all times in the event of emergency situations     Allergies:  LIPITOR - (reactions not documented)               Medications  Valid as of: April 17, 2017 - 11:20 AM    Generic Name Brand Name Tablet Size Instructions for use    Albuterol Sulfate (Aero Soln) albuterol 108 (90 BASE) MCG/ACT Inhale 2  Puffs by mouth every 6 hours as needed for Shortness of Breath.        Azithromycin (Tab) ZITHROMAX 250 MG Take 2 tabs po now then one tab po q24h until finished        Benzonatate (Cap) TESSALON 100 MG Take 1 Cap by mouth 3 times a day as needed for Cough.        Celecoxib (Cap) CELEBREX 200 MG Take 200 mg by mouth 1 time daily as needed for Moderate Pain.        Chlorhexidine Gluconate (Solution) PERIDEX 0.12 % Take 15 mL by mouth 2 times a day.        Cholecalciferol (Tab) cholecalciferol 1000 UNIT Take 1,000 Units by mouth every day.        Clindamycin Phosphate (Gel) CLEOCIN T 1 % Apply 1 Application to affected area(s) 2 times a day.        Clobetasol Propionate (Cream) TEMOVATE 0.05 %         Clotrimazole-Betamethasone (Cream) LOTRISONE 1-0.05 % Apply 1 Application to affected area(s) 2 times a day.        DiazePAM (Tab) VALIUM 5 MG Take 0.5 Tabs by mouth every 6 hours as needed (muscle cramps).        DilTIAZem HCl (CAPSULE SR 24 HR) DILACOR  MG TAKE ONE CAPSULE BY MOUTH ONCE DAILY        DilTIAZem HCl Coated Beads (CAPSULE SR 24 HR) CARDIZEM  MG Take 180 mg by mouth every day.        Donepezil HCl (Tab) ARICEPT 5 MG Take 1 Tab by mouth every day.        Doxycycline Hyclate (Tab) VIBRAMYCIN 100 MG Take 100 mg by mouth 2 times a day. Continuous.        Erlotinib HCl (Tab) Erlotinib HCl 100 MG Take 100 mg by mouth every evening.        Erlotinib HCl (Tab) TARCEVA 25 MG         Esomeprazole Magnesium (Tablet Delayed Response) Esomeprazole Magnesium 20 MG Take  by mouth.        Ezetimibe (Tab) ZETIA 10 MG Take 1 Tab by mouth every day.        Ferrous Sulfate (Tab) ferrous sulfate 325 (65 FE) MG Take 325 mg by mouth every day.        Fluticasone Propionate (Suspension) FLONASE 50 MCG/ACT Spray 2 Sprays in nose 2 times a day.        Gabapentin (Cap) NEURONTIN 300 MG Take 1 Cap by mouth 3 times a day.        HydroCHLOROthiazide (Cap) MICROZIDE 12.5 MG Take 1 Cap by mouth every day.         Hydrocodone-Acetaminophen (Tab) NORCO 5-325 MG Take 1-2 Tabs by mouth every four hours as needed. Indications: Moderate to Moderately Severe Pain        Latanoprost (Solution) XALATAN 0.005 % Place 1 Drop in both eyes every evening.        Multiple Vitamins-Minerals   Take 1 Tab by mouth every day.        Pantoprazole Sodium (Tablet Delayed Response) PROTONIX 40 MG Take 1 Tab by mouth every day.        Pantoprazole Sodium (Tablet Delayed Response) PROTONIX 40 MG TAKE ONE TABLET BY MOUTH ONCE DAILY        Polyethylene Glycol 3350 (Pack) MIRALAX  DISSOLVE ONE PACKET IN WATER AND DRINK ONCE DAILY AS NEEDED FOR CONSTIPATION        ROPINIRole HCl (Tab) REQUIP 1 MG Take 3 Tabs by mouth every bedtime.        Tamsulosin HCl (Cap) FLOMAX 0.4 MG Take 0.4 mg by mouth ONE-HALF HOUR AFTER BREAKFAST.        Timolol Maleate (Solution) TIMOPTIC 0.5 %         .                 Medicines prescribed today were sent to:     [facility] [address] [place] [address], [place] - [address]    [address] [facility] [address]    Phone: [phone] Fax: [phone]    Open 24 Hours?: No      Medication refill instructions:       If your prescription bottle indicates you have medication refills left, it is not necessary to call your provider’s office. Please contact your pharmacy and they will refill your medication.    If your prescription bottle indicates you do not have any refills left, you may request refills at any time through one of the following ways: The online [employer] system (except Urgent Care), by calling your provider’s office, or by asking your pharmacy to contact your provider’s office with a refill request. Medication refills are processed only during regular business hours and may not be available until the next business day. Your provider may request additional information or to have a follow-up visit with you prior to refilling your medication.   *Please Note: Medication refills are assigned a new Rx number when refilled  electronically. Your pharmacy may indicate that no refills were authorized even though a new prescription for the same medication is available at the pharmacy. Please request the medicine by name with the pharmacy before contacting your provider for a refill.        Your To Do List     Future Labs/Procedures Complete By Expires    CBC WITH DIFFERENTIAL  As directed 4/17/2018    COMP METABOLIC PANEL  As directed 4/17/2018    LIPID PROFILE  As directed 4/17/2018      Other Notes About Your Plan     This patient has an appointment on 03/14/2016. At the beginning of the year all chronic conditions should be assessed and Documented in conjunction with any other identified concerns during the visit.     Malignant neoplasm of upper lobe rt bronchus:C34.11  Primary pulmonary HTN:I27.0  Exudative age related macular degeneration:H35.32  Atherosclerosis of aorta:I70.0  Other disorders of pituitary gland:E23.6           MyChart Access Code: Activation code not generated  Current [employer] Status: Active

## 2017-04-17 NOTE — PROGRESS NOTES
This medical record contains text that has been entered with the assistance of computer voice recognition and dictation software.  Therefore, it may contain unintended errors in text, spelling, punctuation, or grammar    No chief complaint on file.      Benjie Gardiner is a 86 y.o. 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 Franklin next wk      Bronchitis  The patient states for the past 2 weeks she's been coughing up greenish sputum, having generalized malaise as well as runny nose stuffy frontal sinuses. He's been using over-the-counter remedies with no improvement. He did recently return from [facility] about 3 weeks ago. Denies any neck pain no neck stiffness no change in vision no headaches no new rashes, no new joint pain, he is able to tolerate by mouth.    Preventative health care  Patient is a very pleasant male who always presents to clinic with his wife and daughter. he is up-to-date on health maintenance practices.      Current medicines (including changes today)  Current Outpatient Prescriptions   Medication Sig Dispense Refill   • clobetasol (TEMOVATE) 0.05 % Cream      • TARCEVA 25 MG Tab      • timolol (TIMOPTIC) 0.5 % Solution      • azithromycin (ZITHROMAX) 250 MG Tab Take 2 tabs po now then one tab po q24h until finished 6 Tab 0   • ropinirole (REQUIP) 1 MG Tab Take 3 Tabs by mouth every bedtime. 270 Tab 0   • polyethylene glycol/lytes (MIRALAX) Pack DISSOLVE ONE PACKET IN WATER AND DRINK ONCE DAILY AS NEEDED FOR CONSTIPATION 90 Each 0   • pantoprazole (PROTONIX) 40 MG Tablet Delayed Response TAKE ONE TABLET BY MOUTH ONCE DAILY 90 Tab 0   • hydrochlorothiazide (MICROZIDE) 12.5 MG capsule Take 1 Cap by mouth every day. 90 Cap 0   • gabapentin (NEURONTIN) 300 MG Cap Take 1 Cap by mouth 3 times a day. 270 Cap 0   •  diazepam (VALIUM) 5 MG Tab Take 0.5 Tabs by mouth every 6 hours as needed (muscle cramps). 60 Tab 0   • ezetimibe (ZETIA) 10 MG Tab Take 1 Tab by mouth every day. 90 Tab 0   • donepezil (ARICEPT) 5 MG Tab Take 1 Tab by mouth every day. 90 Tab 0   • diltiazem (DILACOR XR) 180 MG XR capsule TAKE ONE CAPSULE BY MOUTH ONCE DAILY 30 Cap 0   • albuterol 108 (90 BASE) MCG/ACT Aero Soln inhalation aerosol Inhale 2 Puffs by mouth every 6 hours as needed for Shortness of Breath. 8.5 g 3   • fluticasone (FLONASE) 50 MCG/ACT nasal spray Spray 2 Sprays in nose 2 times a day. 1 Bottle 3   • benzonatate (TESSALON) 100 MG Cap Take 1 Cap by mouth 3 times a day as needed for Cough. 60 Cap 3   • chlorhexidine (PERIDEX) 0.12 % Solution Take 15 mL by mouth 2 times a day. 946 mL 0   • Esomeprazole Magnesium (NEXIUM 24HR) 20 MG Tablet Delayed Response Take  by mouth.     • clotrimazole-betamethasone (LOTRISONE) 1-0.05 % Cream Apply 1 Application to affected area(s) 2 times a day.     • doxycycline (VIBRAMYCIN) 100 MG Tab Take 100 mg by mouth 2 times a day. Continuous.     • Erlotinib HCl (TARCEVA) 100 MG Tab Take 100 mg by mouth every evening.     • tamsulosin (FLOMAX) 0.4 MG capsule Take 0.4 mg by mouth ONE-HALF HOUR AFTER BREAKFAST.     • diltiazem CD (CARDIZEM CD) 180 MG CAPSULE SR 24 HR Take 180 mg by mouth every day.     • celecoxib (CELEBREX) 200 MG Cap Take 200 mg by mouth 1 time daily as needed for Moderate Pain.     • clindamycin (CLEOCIN T) 1 % Gel Apply 1 Application to affected area(s) 2 times a day.     • ferrous sulfate 325 (65 FE) MG tablet Take 325 mg by mouth every day.     • latanoprost (XALATAN) 0.005 % Solution Place 1 Drop in both eyes every evening.     • pantoprazole (PROTONIX) 40 MG Tablet Delayed Response Take 1 Tab by mouth every day. 90 Tab 0   • hydrocodone-acetaminophen (NORCO) 5-325 MG Tab per tablet Take 1-2 Tabs by mouth every four hours as needed. Indications: Moderate to Moderately Severe Pain     •  "Multiple Vitamins-Minerals (MULTIVITAMIN PO) Take 1 Tab by mouth every day.     • vitamin D (CHOLECALCIFEROL) 1000 UNIT TABS Take 1,000 Units by mouth every day.       No current facility-administered medications for this visit.     He  has a past medical history of HTN (hypertension), benign; Mild cognitive impairment; Hyperlipidemia; Paresthesias; Depression; Anxiety; DJD (degenerative joint disease); MEDICAL HOME (2012); Hypertension; Heart burn; Indigestion; Glaucoma; Unspecified cataract; Dementia; Arthritis; Carcinoma in situ of respiratory system; and Cancer (CMS-[facility]) ().  He  has past surgical history that includes cataract extraction (Bilateral, ); other (Left, ); hemorrhoidectomy (08); prostatectomy, radial (); prostatectomy, radical retro; recovery (3/13/2015); colonoscopy - endo (N/A, 2016); and penetrating keratoplasty (Left, 2016).  Social History   Substance Use Topics   • Smoking status: Never Smoker    • Smokeless tobacco: Never Used   • Alcohol Use: No     Social History     Social History Narrative    No known exposure to asbestos, dyes, chemicals, or pesticides.   for 53 years.  Moved to  in .  2 daughters and 1 son.  1 daughter passed away from breast cancer.      Family History   Problem Relation Age of Onset   • Cancer Daughter 49     Breast     Family Status   Relation Status Death Age   • Daughter  50   • Mother     • Father     • Sister     • Maternal Grandmother     • Maternal Grandfather     • Paternal Grandmother     • Paternal Grandfather           ROS  Please see history of present illness    All other systems reviewed and are negative     Objective:     Blood pressure 140/78, pulse 87, temperature 36 °C (96.8 °F), height 1.651 m (5' 5\"), weight 65.681 kg (144 lb 12.8 oz), SpO2 99 %. Body mass index is 24.1 kg/(m^2).  Physical Exam:    Constitutional: Alert, no " distress.  Skin: Warm, dry, good turgor, no rashes in visible areas.  Eye: Equal, round and reactive, conjunctiva clear, lids normal.  ENMT: Lips without lesions, good dentition, oropharynx clear.  Neck: Trachea midline, no masses, no thyromegaly. No cervical or supraclavicular lymphadenopathy.  Respiratory: Unlabored respiratory effort, lungs clear to auscultation, no wheezes, no ronchi.  Cardiovascular: Normal S1, S2, no murmur, no edema.  Abdomen: Soft, non-tender, no masses, no hepatosplenomegaly.  Psych: Alert and oriented x3, normal affect and mood.          Assessment and Plan:   The following treatment plan was discussed, again this medical record contains text that has been entered with the assistance of computer voice recognition and dictation software.  Therefore, it may contain unintended errors in text, spelling, punctuation, or grammar      1. Bronchitis  Since symptoms have persisted beyond 3 weeks  We will proceed with azithromycin    - azithromycin (ZITHROMAX) 250 MG Tab; Take 2 tabs po now then one tab po q24h until finished  Dispense: 6 Tab; Refill: 0    2. Malignant neoplasm of hilus of lung, unspecified laterality (CMS-HCC)  Managed by Dr. Franklin    3. Preventative health care    Due for repeat labs    - COMP METABOLIC PANEL; Future  - LIPID PROFILE; Future  - CBC WITH DIFFERENTIAL; Future        Followup: Return in about 3 months (around 7/17/2017) for Reevaluation.

## 2017-04-18 ENCOUNTER — HOSPITAL ENCOUNTER (OUTPATIENT)
Dept: LAB | Facility: MEDICAL CENTER | Age: 82
End: 2017-04-18
Attending: FAMILY MEDICINE
Payer: MEDICARE

## 2017-04-18 DIAGNOSIS — Z00.00 PREVENTATIVE HEALTH CARE: ICD-10-CM

## 2017-04-18 DIAGNOSIS — R20.2 PARESTHESIAS: ICD-10-CM

## 2017-04-18 LAB
ALBUMIN SERPL BCP-MCNC: 4.3 G/DL (ref 3.2–4.9)
ALBUMIN/GLOB SERPL: 1.3 G/DL
ALP SERPL-CCNC: 83 U/L (ref 30–99)
ALT SERPL-CCNC: 22 U/L (ref 2–50)
ANION GAP SERPL CALC-SCNC: 7 MMOL/L (ref 0–11.9)
AST SERPL-CCNC: 32 U/L (ref 12–45)
BASOPHILS # BLD AUTO: 0.7 % (ref 0–1.8)
BASOPHILS # BLD: 0.04 K/UL (ref 0–0.12)
BILIRUB SERPL-MCNC: 1.3 MG/DL (ref 0.1–1.5)
BUN SERPL-MCNC: 19 MG/DL (ref 8–22)
CALCIUM SERPL-MCNC: 9.7 MG/DL (ref 8.5–10.5)
CHLORIDE SERPL-SCNC: 101 MMOL/L (ref 96–112)
CHOLEST SERPL-MCNC: 165 MG/DL (ref 100–199)
CO2 SERPL-SCNC: 30 MMOL/L (ref 20–33)
CREAT SERPL-MCNC: 1.19 MG/DL (ref 0.5–1.4)
EOSINOPHIL # BLD AUTO: 0.39 K/UL (ref 0–0.51)
EOSINOPHIL NFR BLD: 6.4 % (ref 0–6.9)
ERYTHROCYTE [DISTWIDTH] IN BLOOD BY AUTOMATED COUNT: 45.7 FL (ref 35.9–50)
GFR SERPL CREATININE-BSD FRML MDRD: 58 ML/MIN/1.73 M 2
GLOBULIN SER CALC-MCNC: 3.4 G/DL (ref 1.9–3.5)
GLUCOSE SERPL-MCNC: 105 MG/DL (ref 65–99)
HCT VFR BLD AUTO: 40.8 % (ref 42–52)
HDLC SERPL-MCNC: 65 MG/DL
HGB BLD-MCNC: 13 G/DL (ref 14–18)
IMM GRANULOCYTES # BLD AUTO: 0.06 K/UL (ref 0–0.11)
IMM GRANULOCYTES NFR BLD AUTO: 1 % (ref 0–0.9)
LDLC SERPL CALC-MCNC: 78 MG/DL
LYMPHOCYTES # BLD AUTO: 1.26 K/UL (ref 1–4.8)
LYMPHOCYTES NFR BLD: 20.8 % (ref 22–41)
MCH RBC QN AUTO: 26.6 PG (ref 27–33)
MCHC RBC AUTO-ENTMCNC: 31.9 G/DL (ref 33.7–35.3)
MCV RBC AUTO: 83.6 FL (ref 81.4–97.8)
MONOCYTES # BLD AUTO: 0.69 K/UL (ref 0–0.85)
MONOCYTES NFR BLD AUTO: 11.4 % (ref 0–13.4)
NEUTROPHILS # BLD AUTO: 3.62 K/UL (ref 1.82–7.42)
NEUTROPHILS NFR BLD: 59.7 % (ref 44–72)
NRBC # BLD AUTO: 0 K/UL
NRBC BLD AUTO-RTO: 0 /100 WBC
PLATELET # BLD AUTO: 162 K/UL (ref 164–446)
PMV BLD AUTO: 12.2 FL (ref 9–12.9)
POTASSIUM SERPL-SCNC: 4.4 MMOL/L (ref 3.6–5.5)
PROT SERPL-MCNC: 7.7 G/DL (ref 6–8.2)
RBC # BLD AUTO: 4.88 M/UL (ref 4.7–6.1)
SODIUM SERPL-SCNC: 138 MMOL/L (ref 135–145)
TRIGL SERPL-MCNC: 109 MG/DL (ref 0–149)
WBC # BLD AUTO: 6.1 K/UL (ref 4.8–10.8)

## 2017-04-18 PROCEDURE — 36415 COLL VENOUS BLD VENIPUNCTURE: CPT

## 2017-04-18 PROCEDURE — 85025 COMPLETE CBC W/AUTO DIFF WBC: CPT

## 2017-04-18 PROCEDURE — 80053 COMPREHEN METABOLIC PANEL: CPT

## 2017-04-18 PROCEDURE — 80061 LIPID PANEL: CPT

## 2017-04-19 ENCOUNTER — OFFICE VISIT (OUTPATIENT)
Dept: URGENT CARE | Facility: CLINIC | Age: 82
End: 2017-04-19
Payer: MEDICARE

## 2017-04-19 ENCOUNTER — APPOINTMENT (OUTPATIENT)
Dept: RADIOLOGY | Facility: IMAGING CENTER | Age: 82
End: 2017-04-19
Attending: NURSE PRACTITIONER
Payer: MEDICARE

## 2017-04-19 VITALS
RESPIRATION RATE: 15 BRPM | OXYGEN SATURATION: 96 % | HEIGHT: 65 IN | SYSTOLIC BLOOD PRESSURE: 140 MMHG | DIASTOLIC BLOOD PRESSURE: 86 MMHG | TEMPERATURE: 98.6 F | WEIGHT: 144 LBS | BODY MASS INDEX: 23.99 KG/M2 | HEART RATE: 83 BPM

## 2017-04-19 DIAGNOSIS — R50.9 FEVER, UNSPECIFIED FEVER CAUSE: ICD-10-CM

## 2017-04-19 DIAGNOSIS — R05.9 COUGH: ICD-10-CM

## 2017-04-19 DIAGNOSIS — C34.11 MALIGNANT NEOPLASM OF UPPER LOBE OF RIGHT LUNG (HCC): ICD-10-CM

## 2017-04-19 LAB
APPEARANCE UR: CLEAR
BILIRUB UR STRIP-MCNC: NORMAL MG/DL
COLOR UR AUTO: YELLOW
GLUCOSE UR STRIP.AUTO-MCNC: NORMAL MG/DL
KETONES UR STRIP.AUTO-MCNC: NORMAL MG/DL
LEUKOCYTE ESTERASE UR QL STRIP.AUTO: NORMAL
NITRITE UR QL STRIP.AUTO: NORMAL
PH UR STRIP.AUTO: 8 [PH] (ref 5–8)
PROT UR QL STRIP: 100 MG/DL
RBC UR QL AUTO: NORMAL
SP GR UR STRIP.AUTO: 1
UROBILINOGEN UR STRIP-MCNC: NORMAL MG/DL

## 2017-04-19 PROCEDURE — G8420 CALC BMI NORM PARAMETERS: HCPCS | Performed by: NURSE PRACTITIONER

## 2017-04-19 PROCEDURE — 71020 DX-CHEST-2 VIEWS: CPT | Mod: TC | Performed by: NURSE PRACTITIONER

## 2017-04-19 PROCEDURE — 99213 OFFICE O/P EST LOW 20 MIN: CPT | Performed by: NURSE PRACTITIONER

## 2017-04-19 PROCEDURE — 81002 URINALYSIS NONAUTO W/O SCOPE: CPT | Performed by: NURSE PRACTITIONER

## 2017-04-19 PROCEDURE — 1101F PT FALLS ASSESS-DOCD LE1/YR: CPT | Performed by: NURSE PRACTITIONER

## 2017-04-19 PROCEDURE — 1036F TOBACCO NON-USER: CPT | Performed by: NURSE PRACTITIONER

## 2017-04-19 PROCEDURE — G8432 DEP SCR NOT DOC, RNG: HCPCS | Performed by: NURSE PRACTITIONER

## 2017-04-19 PROCEDURE — 4040F PNEUMOC VAC/ADMIN/RCVD: CPT | Performed by: NURSE PRACTITIONER

## 2017-04-19 RX ORDER — GABAPENTIN 300 MG/1
CAPSULE ORAL
Refills: 0 | OUTPATIENT
Start: 2017-04-19

## 2017-04-19 ASSESSMENT — ENCOUNTER SYMPTOMS
MYALGIAS: 1
ORTHOPNEA: 0
SORE THROAT: 0
CHILLS: 1
FEVER: 1
SPUTUM PRODUCTION: 1
VOMITING: 0
NAUSEA: 0
COUGH: 1

## 2017-04-19 NOTE — MR AVS SNAPSHOT
"        Benjie Rigoberto Gardiner   2017 12:00 PM   Office Visit   MRN: [id number]    Department:  [facility]   Dept Phone:  [phone]    Description:  Male : 3/1/1931   Provider:  SHERYL Talbert           Reason for Visit     Fever cough, suspects penumonia, seen by his pcp gave z-pack       Allergies as of 2017     Allergen Noted Reactions    Lipitor [Atorvastatin Calcium] 2009       Leg cramps      You were diagnosed with     Cough   [786.2.ICD-9-CM]       Fever, unspecified fever cause   [1585457]         Vital Signs     Blood Pressure Pulse Temperature Respirations Height Weight    140/86 mmHg 83 37 °C (98.6 °F) 15 1.651 m (5' 5\") 65.318 kg (144 lb)    Body Mass Index Oxygen Saturation Smoking Status             23.96 kg/m2 96% Never Smoker          Basic Information     Date Of Birth Sex Race Ethnicity Preferred Language    3/1/1931 Male Other Non- English      Problem List              ICD-10-CM Priority Class Noted - Resolved    HTN (hypertension), benign I10   Unknown - Present    Mild cognitive impairment G31.84   Unknown - Present    Hyperlipidemia E78.5   Unknown - Present    DJD (degenerative joint disease) M19.90   Unknown - Present    Paresthesias R20.2   Unknown - Present    Pulmonary nodule R91.1   2015 - Present    RLS (restless legs syndrome) G25.81   3/2/2015 - Present    Solitary pulmonary nodule R91.1   3/13/2015 - Present    Lung cancer (CMS-HCC) C34.90   2015 - Present    Primary pulmonary hypertension (CMS-HCC) I27.0 High  2015 - Present    Gastroesophageal reflux disease without esophagitis K21.9   2015 - Present    Chronic rhinitis J31.0   2015 - Present    Weight loss R63.4   2015 - Present    Erosive gastritis K29.00   12/10/2015 - Present    Cramp in lower leg R25.2   2016 - Present    Normocytic anemia D64.9   2016 - Present    Secondary corneal edema H18.239   2016 - Present    Left hip pain M25.552   " 6/27/2016 - Present    Seborrheic dermatitis of scalp L21.9   6/27/2016 - Present    Rash on both feet R21   6/27/2016 - Present    GIB (gastrointestinal bleeding) K92.2   9/8/2016 - Present    SBO (small bowel obstruction) (CMS-HCC) K56.69   9/9/2016 - Present    Dementia F03.90   9/10/2016 - Present    Anemia D64.9   9/22/2016 - Present    Hypocalcemia E83.51   9/22/2016 - Present    ZANDER (acute kidney injury) (CMS-HCC) N17.9   9/22/2016 - Present    Acute nasopharyngitis J00   12/2/2016 - Present    Cough R05   1/10/2017 - Present    Bronchitis J40   4/17/2017 - Present    Preventative health care Z00.00   4/17/2017 - Present      Health Maintenance        Date Due Completion Dates    IMM DTaP/Tdap/Td Vaccine (2 - Td) 10/8/2025 10/8/2015            Results     POCT Urinalysis      Component Value Standard Range & Units    POC Color YELLOW Negative    POC Appearance CLEAR Negative    POC Leukocyte Esterase neg Negative    POC Nitrites neg Negative    POC Urobiligen neg Negative (0.2) mg/dL    POC Protein 100 Negative mg/dL    POC Urine PH 8.0 5.0 - 8.0    POC Blood neg Negative    POC Specific Gravity 1.005 <1.005 - >1.030    POC Ketones neg Negative mg/dL    POC Biliruben neg Negative mg/dL    POC Glucose neg Negative mg/dL                        Current Immunizations     13-VALENT PCV PREVNAR 10/8/2015    Influenza TIV (IM) 9/7/2010    Influenza Vaccine Adult HD 10/24/2016, 10/8/2015    Influenza Vaccine Quad Inj (Pf) 8/25/2014, 10/20/2011    Influenza Vaccine Quad Inj (Preserved) 10/8/2013, 9/20/2012    Pneumococcal polysaccharide vaccine (PPSV-23) 9/20/2012    SHINGLES VACCINE 10/8/2015    Tdap Vaccine 10/8/2015      Below and/or attached are the medications your provider expects you to take. Review all of your home medications and newly ordered medications with your provider and/or pharmacist. Follow medication instructions as directed by your provider and/or pharmacist. Please keep your medication list with  you and share with your provider. Update the information when medications are discontinued, doses are changed, or new medications (including over-the-counter products) are added; and carry medication information at all times in the event of emergency situations     Allergies:  LIPITOR - (reactions not documented)               Medications  Valid as of: April 19, 2017 - 12:50 PM    Generic Name Brand Name Tablet Size Instructions for use    Albuterol Sulfate (Aero Soln) albuterol 108 (90 BASE) MCG/ACT Inhale 2 Puffs by mouth every 6 hours as needed for Shortness of Breath.        Azithromycin (Tab) ZITHROMAX 250 MG Take 2 tabs po now then one tab po q24h until finished        Benzonatate (Cap) TESSALON 100 MG Take 1 Cap by mouth 3 times a day as needed for Cough.        Celecoxib (Cap) CELEBREX 200 MG Take 200 mg by mouth 1 time daily as needed for Moderate Pain.        Chlorhexidine Gluconate (Solution) PERIDEX 0.12 % Take 15 mL by mouth 2 times a day.        Cholecalciferol (Tab) cholecalciferol 1000 UNIT Take 1,000 Units by mouth every day.        Clindamycin Phosphate (Gel) CLEOCIN T 1 % Apply 1 Application to affected area(s) 2 times a day.        Clobetasol Propionate (Cream) TEMOVATE 0.05 %         Clotrimazole-Betamethasone (Cream) LOTRISONE 1-0.05 % Apply 1 Application to affected area(s) 2 times a day.        DiazePAM (Tab) VALIUM 5 MG Take 0.5 Tabs by mouth every 6 hours as needed (muscle cramps).        DilTIAZem HCl (CAPSULE SR 24 HR) DILACOR  MG TAKE ONE CAPSULE BY MOUTH ONCE DAILY        DilTIAZem HCl Coated Beads (CAPSULE SR 24 HR) CARDIZEM  MG Take 180 mg by mouth every day.        Donepezil HCl (Tab) ARICEPT 5 MG Take 1 Tab by mouth every day.        Doxycycline Hyclate (Tab) VIBRAMYCIN 100 MG Take 100 mg by mouth 2 times a day. Continuous.        Erlotinib HCl (Tab) Erlotinib HCl 100 MG Take 100 mg by mouth every evening.        Erlotinib HCl (Tab) TARCEVA 25 MG         Esomeprazole  Magnesium (Tablet Delayed Response) Esomeprazole Magnesium 20 MG Take  by mouth.        Ezetimibe (Tab) ZETIA 10 MG Take 1 Tab by mouth every day.        Ferrous Sulfate (Tab) ferrous sulfate 325 (65 FE) MG Take 325 mg by mouth every day.        Fluticasone Propionate (Suspension) FLONASE 50 MCG/ACT Spray 2 Sprays in nose 2 times a day.        Gabapentin (Cap) NEURONTIN 300 MG Take 1 Cap by mouth 3 times a day.        HydroCHLOROthiazide (Cap) MICROZIDE 12.5 MG Take 1 Cap by mouth every day.        Hydrocodone-Acetaminophen (Tab) NORCO 5-325 MG Take 1-2 Tabs by mouth every four hours as needed. Indications: Moderate to Moderately Severe Pain        Latanoprost (Solution) XALATAN 0.005 % Place 1 Drop in both eyes every evening.        Multiple Vitamins-Minerals   Take 1 Tab by mouth every day.        Pantoprazole Sodium (Tablet Delayed Response) PROTONIX 40 MG Take 1 Tab by mouth every day.        Pantoprazole Sodium (Tablet Delayed Response) PROTONIX 40 MG TAKE ONE TABLET BY MOUTH ONCE DAILY        Polyethylene Glycol 3350 (Pack) MIRALAX  DISSOLVE ONE PACKET IN WATER AND DRINK ONCE DAILY AS NEEDED FOR CONSTIPATION        ROPINIRole HCl (Tab) REQUIP 1 MG Take 3 Tabs by mouth every bedtime.        Tamsulosin HCl (Cap) FLOMAX 0.4 MG Take 0.4 mg by mouth ONE-HALF HOUR AFTER BREAKFAST.        Timolol Maleate (Solution) TIMOPTIC 0.5 %         .                 Medicines prescribed today were sent to:     [facility] [address] [place] [address], [place] - [address]    [address] [facility] [address]    Phone: [phone] Fax: [phone]    Open 24 Hours?: No      Medication refill instructions:       If your prescription bottle indicates you have medication refills left, it is not necessary to call your provider’s office. Please contact your pharmacy and they will refill your medication.    If your prescription bottle indicates you do not have any refills left, you may request refills at any time through one of the  following ways: The online [employer] system (except Urgent Care), by calling your provider’s office, or by asking your pharmacy to contact your provider’s office with a refill request. Medication refills are processed only during regular business hours and may not be available until the next business day. Your provider may request additional information or to have a follow-up visit with you prior to refilling your medication.   *Please Note: Medication refills are assigned a new Rx number when refilled electronically. Your pharmacy may indicate that no refills were authorized even though a new prescription for the same medication is available at the pharmacy. Please request the medicine by name with the pharmacy before contacting your provider for a refill.        Your To Do List     Future Labs/Procedures Complete By Expires    DX-CHEST-2 VIEWS  As directed 4/19/2018      Other Notes About Your Plan     This patient has an appointment on 03/14/2016. At the beginning of the year all chronic conditions should be assessed and Documented in conjunction with any other identified concerns during the visit.     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 Access Code: Activation code not generated  Current [employer] Status: Active

## 2017-04-19 NOTE — PROGRESS NOTES
"Subjective:      Benjie Gardiner is a 86 y.o. male who presents with Fever            Fever   This is a new problem. The current episode started in the past 7 days. The problem occurs constantly. The problem has been unchanged. The maximum temperature noted was 101 to 101.9 F. Associated symptoms include congestion and coughing. Pertinent negatives include no nausea, sore throat or vomiting.   Patient was seen in PCP on Monday and rx'd a z-pack. Patient is concerned as he spiked temp of 101 yesterday. He has no fever today. Still coughing, productive of yellow sputum. No shortness of breath. Being treated for lung cancer.    Allergies, medications and history reviewed by me today      Review of Systems   Constitutional: Positive for fever, chills and malaise/fatigue.   HENT: Positive for congestion. Negative for sore throat.    Respiratory: Positive for cough and sputum production.    Cardiovascular: Negative for orthopnea.   Gastrointestinal: Negative for nausea and vomiting.   Musculoskeletal: Positive for myalgias.          Objective:     /86 mmHg  Pulse 83  Temp(Src) 37 °C (98.6 °F)  Resp 15  Ht 1.651 m (5' 5\")  Wt 65.318 kg (144 lb)  BMI 23.96 kg/m2  SpO2 96%     Physical Exam   Constitutional: He is oriented to person, place, and time. He appears well-developed and well-nourished. No distress.   HENT:   Head: Normocephalic and atraumatic.   Right Ear: External ear and ear canal normal. Tympanic membrane is not injected and not perforated. No middle ear effusion.   Left Ear: External ear and ear canal normal. Tympanic membrane is not injected and not perforated.  No middle ear effusion.   Nose: Mucosal edema present.   Mouth/Throat: Posterior oropharyngeal erythema present. No oropharyngeal exudate.   Eyes: Conjunctivae are normal. Right eye exhibits no discharge. Left eye exhibits no discharge.   Neck: Normal range of motion. Neck supple.   Cardiovascular: Normal rate, regular rhythm and " normal heart sounds.    No murmur heard.  Pulmonary/Chest: Effort normal. No respiratory distress. He has wheezes.   Mild occasional wheezes.   Musculoskeletal: Normal range of motion.   Normal movement of all 4 extremities.   Lymphadenopathy:     He has no cervical adenopathy.        Right: No supraclavicular adenopathy present.        Left: No supraclavicular adenopathy present.   Neurological: He is alert and oriented to person, place, and time. Gait normal.   Skin: Skin is warm and dry.   Psychiatric: He has a normal mood and affect. His behavior is normal. Thought content normal.   Nursing note and vitals reviewed.              Assessment/Plan:     1. Cough  DX-CHEST-2 VIEWS    POCT Urinalysis   2. Fever, unspecified fever cause     3. Malignant neoplasm of upper lobe of right lung (CMS-HCC)         Patient with no acute process on x-ray.  Urine is negative for infection.  At this time he is well covered for respiratory infection with both z-pack and doxy.  Reassured.  Follow up as needed.

## 2017-04-20 RX ORDER — DONEPEZIL HYDROCHLORIDE 5 MG/1
TABLET, FILM COATED ORAL
Qty: 90 TAB | Refills: 0 | Status: SHIPPED | OUTPATIENT
Start: 2017-04-20 | End: 2017-01-01 | Stop reason: SDUPTHER

## 2017-04-20 RX ORDER — GABAPENTIN 300 MG/1
300 CAPSULE ORAL 3 TIMES DAILY
Qty: 270 CAP | Refills: 0 | Status: SHIPPED | OUTPATIENT
Start: 2017-04-20 | End: 2017-01-01 | Stop reason: SDUPTHER

## 2017-04-20 RX ORDER — HYDROCHLOROTHIAZIDE 12.5 MG/1
CAPSULE, GELATIN COATED ORAL
Qty: 90 CAP | Refills: 0 | Status: SHIPPED | OUTPATIENT
Start: 2017-04-20 | End: 2017-01-01 | Stop reason: SDUPTHER

## 2017-04-24 RX ORDER — HYDROCHLOROTHIAZIDE 12.5 MG/1
CAPSULE, GELATIN COATED ORAL
Refills: 0 | OUTPATIENT
Start: 2017-04-24

## 2017-04-25 ENCOUNTER — PATIENT OUTREACH (OUTPATIENT)
Dept: HEALTH INFORMATION MANAGEMENT | Facility: OTHER | Age: 82
End: 2017-04-25

## 2017-04-25 NOTE — PROGRESS NOTES
Attempt #:1    Verify PCP: NO    Communication Preference Obtained: yes     Review Care Team: yes    Annual Wellness Visit Scheduling  1. Scheduling Status:NA     Care Coordination Enrollment (Attempt to Enroll in Care Coordination)  2. Is patient appropriate: YES  3. Is patient interested: NO - not interested     Care Gap Scheduling (Attempt to Schedule EACH Overdue Care Gap!)     Health Maintenance Due   Topic Date Due   • Annual Wellness Visit  NA         TSO3 Activation: already active  TSO3 Yaima: no  Virtual Visits: no  Opt [employer] to Text Messages: no

## 2017-04-28 ENCOUNTER — HOSPITAL ENCOUNTER (OUTPATIENT)
Dept: RADIOLOGY | Facility: MEDICAL CENTER | Age: 82
End: 2017-04-28
Attending: INTERNAL MEDICINE
Payer: MEDICARE

## 2017-04-28 DIAGNOSIS — C34.91 NON-SMALL CELL CARCINOMA OF LUNG, RIGHT (HCC): ICD-10-CM

## 2017-04-28 PROCEDURE — 71260 CT THORAX DX C+: CPT

## 2017-04-28 PROCEDURE — 700117 HCHG RX CONTRAST REV CODE 255: Performed by: INTERNAL MEDICINE

## 2017-04-28 RX ADMIN — IOHEXOL 100 ML: 350 INJECTION, SOLUTION INTRAVENOUS at 15:23

## 2017-05-03 ENCOUNTER — APPOINTMENT (OUTPATIENT)
Dept: MEDICAL GROUP | Age: 82
End: 2017-05-03
Payer: MEDICARE

## 2017-05-22 RX ORDER — POLYETHYLENE GLYCOL 3350 17 G/17G
POWDER, FOR SOLUTION ORAL
Qty: 90 EACH | Refills: 0 | Status: SHIPPED | OUTPATIENT
Start: 2017-05-22 | End: 2017-01-01 | Stop reason: SDUPTHER

## 2017-06-14 ENCOUNTER — TELEPHONE (OUTPATIENT)
Dept: MEDICAL GROUP | Age: 82
End: 2017-06-14

## 2017-06-14 NOTE — TELEPHONE ENCOUNTER
ESTABLISHED PATIENT PRE-VISIT PLANNING     Note: Patient will not be contacted if there is no indication to call.     1.  Reviewed notes from the last few office visits within the medical group: Yes    2.  If any orders were placed at last visit or intended to be done for this visit (i.e. 6 mos follow-up), do we have Results/Consult Notes?        •  Labs - Labs ordered, completed and results are in chart.       •  Imaging - Imaging was not ordered at last office visit.       •  Referrals - No referrals were ordered at last office visit.    3. Is this appointment scheduled as a Hospital Follow-Up? No    4.  Immunizations were updated in [place] using Web[employer]?: Yes       •  Web Iz Recommendations: HEPATITIS A  TD    5.  Patient is due for the following Health Maintenance Topics:   Health Maintenance Due   Topic Date Due   • Annual Wellness Visit  03/15/2017       - Patient has completed FLU, PNEUMOVAX (PPSV23), PREVNAR (PCV13) , TDAP and ZOSTAVAX (Shingles) Immunization(s) per WebIZ. Chart has been updated.    6.  Patient was NOT informed to arrive 15 min prior to their scheduled appointment and bring in their medication bottles.

## 2017-06-15 ENCOUNTER — OFFICE VISIT (OUTPATIENT)
Dept: MEDICAL GROUP | Age: 82
End: 2017-06-15
Payer: MEDICARE

## 2017-06-15 VITALS
HEIGHT: 65 IN | HEART RATE: 69 BPM | DIASTOLIC BLOOD PRESSURE: 80 MMHG | OXYGEN SATURATION: 95 % | TEMPERATURE: 97 F | WEIGHT: 140.8 LBS | SYSTOLIC BLOOD PRESSURE: 138 MMHG | BODY MASS INDEX: 23.46 KG/M2

## 2017-06-15 DIAGNOSIS — I95.2 HYPOTENSION DUE TO DRUGS: ICD-10-CM

## 2017-06-15 DIAGNOSIS — R25.2 CRAMP IN LOWER LEG: ICD-10-CM

## 2017-06-15 DIAGNOSIS — I10 HTN (HYPERTENSION), BENIGN: ICD-10-CM

## 2017-06-15 DIAGNOSIS — E78.00 PURE HYPERCHOLESTEROLEMIA: ICD-10-CM

## 2017-06-15 PROCEDURE — 99214 OFFICE O/P EST MOD 30 MIN: CPT | Performed by: FAMILY MEDICINE

## 2017-06-15 NOTE — ASSESSMENT & PLAN NOTE
At home bp measured was 100/50 by his nephew who is a physical therapist. As result he stopped his blood pressure medications had occurred by night and diltiazem for the past 2 nights and it was in the normal range she states. He did not have any dizziness, no presyncopal episodes, no syncope, headaches.

## 2017-06-15 NOTE — MR AVS SNAPSHOT
"        Benjie Rigoberto Gardiner   6/15/2017 2:20 PM   Office Visit   MRN: [id number]    Department:  [address] [place] [address]   Dept Phone:  [phone]    Description:  Male : 3/1/1931   Provider:  Asher Garza M.D.           Reason for Visit     Other see reason for visit      Allergies as of 6/15/2017     Allergen Noted Reactions    Lipitor [Atorvastatin Calcium] 2009       Leg cramps      You were diagnosed with     Hypotension due to drugs   [510497]       Pure hypercholesterolemia   [272.0.ICD-9-CM]       Cramp in lower leg   [5477811]       HTN (hypertension), benign   [885309]         Vital Signs     Blood Pressure Pulse Temperature Height Weight Body Mass Index    138/80 mmHg 69 36.1 °C (97 °F) 1.651 m (5' 5\") 63.866 kg (140 lb 12.8 oz) 23.43 kg/m2    Oxygen Saturation Smoking Status                95% Never Smoker           Basic Information     Date Of Birth Sex Race Ethnicity Preferred Language    3/1/1931 Male Other Non- English      Problem List              ICD-10-CM Priority Class Noted - Resolved    HTN (hypertension), benign I10   Unknown - Present    Mild cognitive impairment G31.84   Unknown - Present    Hyperlipidemia E78.5   Unknown - Present    DJD (degenerative joint disease) M19.90   Unknown - Present    Paresthesias R20.2   Unknown - Present    Pulmonary nodule R91.1   2015 - Present    RLS (restless legs syndrome) G25.81   3/2/2015 - Present    Solitary pulmonary nodule R91.1   3/13/2015 - Present    Lung cancer (CMS-HCC) C34.90   2015 - Present    Primary pulmonary hypertension (CMS-HCC) I27.0 High  2015 - Present    Gastroesophageal reflux disease without esophagitis K21.9   2015 - Present    Chronic rhinitis J31.0   2015 - Present    Weight loss R63.4   2015 - Present    Erosive gastritis K29.60   12/10/2015 - Present    Cramp in lower leg R25.2   2016 - Present    Normocytic anemia D64.9   2016 - Present    Secondary corneal " edema H18.239   4/5/2016 - Present    Left hip pain M25.552   6/27/2016 - Present    Seborrheic dermatitis of scalp L21.9   6/27/2016 - Present    Rash on both feet R21   6/27/2016 - Present    GIB (gastrointestinal bleeding) K92.2   9/8/2016 - Present    SBO (small bowel obstruction) (CMS-HCC) K56.69   9/9/2016 - Present    Dementia F03.90   9/10/2016 - Present    Anemia D64.9   9/22/2016 - Present    Hypocalcemia E83.51   9/22/2016 - Present    ZANDER (acute kidney injury) (CMS-HCC) N17.9   9/22/2016 - Present    Acute nasopharyngitis J00   12/2/2016 - Present    Cough R05   1/10/2017 - Present    Bronchitis J40   4/17/2017 - Present    Preventative health care Z00.00   4/17/2017 - Present    Hypotension due to drugs I95.2   6/15/2017 - Present      Health Maintenance        Date Due Completion Dates    IMM DTaP/Tdap/Td Vaccine (2 - Td) 10/8/2025 10/8/2015            Current Immunizations     13-VALENT PCV PREVNAR 10/8/2015    Influenza TIV (IM) 9/7/2010    Influenza Vaccine Adult HD 10/24/2016, 10/8/2015    Influenza Vaccine Quad Inj (Pf) 8/25/2014, 10/20/2011    Influenza Vaccine Quad Inj (Preserved) 10/8/2013, 9/20/2012    Pneumococcal polysaccharide vaccine (PPSV-23) 9/20/2012    SHINGLES VACCINE 10/8/2015    Tdap Vaccine 10/8/2015      Below and/or attached are the medications your provider expects you to take. Review all of your home medications and newly ordered medications with your provider and/or pharmacist. Follow medication instructions as directed by your provider and/or pharmacist. Please keep your medication list with you and share with your provider. Update the information when medications are discontinued, doses are changed, or new medications (including over-the-counter products) are added; and carry medication information at all times in the event of emergency situations     Allergies:  LIPITOR - (reactions not documented)               Medications  Valid as of: Nichelle 15, 2017 -  3:24 PM    Generic  Name Brand Name Tablet Size Instructions for use    Albuterol Sulfate (Aero Soln) albuterol 108 (90 BASE) MCG/ACT Inhale 2 Puffs by mouth every 6 hours as needed for Shortness of Breath.        Azithromycin (Tab) ZITHROMAX 250 MG Take 2 tabs po now then one tab po q24h until finished        Benzonatate (Cap) TESSALON 100 MG Take 1 Cap by mouth 3 times a day as needed for Cough.        Celecoxib (Cap) CELEBREX 200 MG Take 200 mg by mouth 1 time daily as needed for Moderate Pain.        Chlorhexidine Gluconate (Solution) PERIDEX 0.12 % Take 15 mL by mouth 2 times a day.        Cholecalciferol (Tab) cholecalciferol 1000 UNIT Take 1,000 Units by mouth every day.        Clindamycin Phosphate (Gel) CLEOCIN T 1 % Apply 1 Application to affected area(s) 2 times a day.        Clobetasol Propionate (Cream) TEMOVATE 0.05 %         Clotrimazole-Betamethasone (Cream) LOTRISONE 1-0.05 % Apply 1 Application to affected area(s) 2 times a day.        DiazePAM (Tab) VALIUM 5 MG Take 0.5 Tabs by mouth every 6 hours as needed (muscle cramps).        DilTIAZem HCl (CAPSULE SR 24 HR) DILACOR  MG TAKE ONE CAPSULE BY MOUTH ONCE DAILY        DilTIAZem HCl Coated Beads (CAPSULE SR 24 HR) CARDIZEM  MG Take 180 mg by mouth every day.        Donepezil HCl (Tab) ARICEPT 5 MG TAKE ONE TABLET BY MOUTH ONCE DAILY        Doxycycline Hyclate (Tab) VIBRAMYCIN 100 MG Take 100 mg by mouth 2 times a day. Continuous.        Erlotinib HCl (Tab) Erlotinib HCl 100 MG Take 100 mg by mouth every evening.        Erlotinib HCl (Tab) TARCEVA 25 MG         Esomeprazole Magnesium (Tablet Delayed Response) Esomeprazole Magnesium 20 MG Take  by mouth.        Ezetimibe (Tab) ZETIA 10 MG Take 1 Tab by mouth every day.        Ferrous Sulfate (Tab) ferrous sulfate 325 (65 FE) MG Take 325 mg by mouth every day.        Fluticasone Propionate (Suspension) FLONASE 50 MCG/ACT Spray 2 Sprays in nose 2 times a day.        Gabapentin (Cap) NEURONTIN 300 MG Take 1 Cap  by mouth 3 times a day.        HydroCHLOROthiazide (Cap) MICROZIDE 12.5 MG TAKE ONE CAPSULE BY MOUTH ONCE DAILY        Hydrocodone-Acetaminophen (Tab) NORCO 5-325 MG Take 1-2 Tabs by mouth every four hours as needed. Indications: Moderate to Moderately Severe Pain        Latanoprost (Solution) XALATAN 0.005 % Place 1 Drop in both eyes every evening.        Multiple Vitamins-Minerals   Take 1 Tab by mouth every day.        Pantoprazole Sodium (Tablet Delayed Response) PROTONIX 40 MG Take 1 Tab by mouth every day.        Pantoprazole Sodium (Tablet Delayed Response) PROTONIX 40 MG TAKE ONE TABLET BY MOUTH ONCE DAILY        Polyethylene Glycol 3350 (Pack) MIRALAX  DISSOLVE ONE PACKET IN WATER AND DRINK ONCE DAILY AS NEEDED FOR CONSTIPATION        ROPINIRole HCl (Tab) REQUIP 1 MG Take 3 Tabs by mouth every bedtime.        Tamsulosin HCl (Cap) FLOMAX 0.4 MG Take 0.4 mg by mouth ONE-HALF HOUR AFTER BREAKFAST.        Timolol Maleate (Solution) TIMOPTIC 0.5 %         .                 Medicines prescribed today were sent to:     [facility] [address] [place] [address], [place] - [address] [place] [address]    [address] [facility] [address]    Phone: [phone] Fax: [phone]    Open 24 Hours?: No      Medication refill instructions:       If your prescription bottle indicates you have medication refills left, it is not necessary to call your provider’s office. Please contact your pharmacy and they will refill your medication.    If your prescription bottle indicates you do not have any refills left, you may request refills at any time through one of the following ways: The online [employer] system (except Urgent Care), by calling your provider’s office, or by asking your pharmacy to contact your provider’s office with a refill request. Medication refills are processed only during regular business hours and may not be available until the next business day. Your provider may request additional information or to have a follow-up visit  with you prior to refilling your medication.   *Please Note: Medication refills are assigned a new Rx number when refilled electronically. Your pharmacy may indicate that no refills were authorized even though a new prescription for the same medication is available at the pharmacy. Please request the medicine by name with the pharmacy before contacting your provider for a refill.        Your To Do List     Future Labs/Procedures Complete By Expires    LIPID PROFILE  As directed 6/16/2018      Other Notes About Your Plan     This patient has an appointment on 03/14/2016. At the beginning of the year all chronic conditions should be assessed and Documented in conjunction with any other identified concerns during the visit.     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

## 2017-06-15 NOTE — PROGRESS NOTES
This medical record contains text that has been entered with the assistance of computer voice recognition and dictation software.  Therefore, it may contain unintended errors in text, spelling, punctuation, or grammar    Chief Complaint   Patient presents with   • Other     see reason for visit       Benjie Gardiner is a 86 y.o. male here evaluation and management of: Hypotension, likely cramps, hyperlipidemia,      HPI:     Hypotension due to drugs  At home bp measured was 100/50 by his nephew who is a physical therapist. As result he stopped his blood pressure medications had occurred by night and diltiazem for the past 2 nights and it was in the normal range she states. He did not have any dizziness, no presyncopal episodes, no syncope, headaches.      Cramp in lower leg  Always gets cramps at night  He's been taking Valium when necessary.  He does get good relief with Valium sometimes he has to take 2 tablets he states      Hyperlipidemia  Patient states that he is really worried about his cholesterol level because he eats 5 chicken nuggets a day. He also eats almonds nonstop. Although he had a normal cholesterol level II months ago he wants this rechecked.    HTN (hypertension), benign  Patient states that he stopped his hydrochlorothiazide as well as diltiazem for the past 2 days because he had low blood pressure measured at 100/50 by his nephew who is a physical therapist.      Current medicines (including changes today)  Current Outpatient Prescriptions   Medication Sig Dispense Refill   • polyethylene glycol/lytes (MIRALAX) Pack DISSOLVE ONE PACKET IN WATER AND DRINK ONCE DAILY AS NEEDED FOR CONSTIPATION 90 Each 0   • hydrochlorothiazide (MICROZIDE) 12.5 MG capsule TAKE ONE CAPSULE BY MOUTH ONCE DAILY 90 Cap 0   • donepezil (ARICEPT) 5 MG Tab TAKE ONE TABLET BY MOUTH ONCE DAILY 90 Tab 0   • gabapentin (NEURONTIN) 300 MG Cap Take 1 Cap by mouth 3 times a day. 270 Cap 0   • clobetasol (TEMOVATE) 0.05 %  Cream      • TARCEVA 25 MG Tab      • timolol (TIMOPTIC) 0.5 % Solution      • ropinirole (REQUIP) 1 MG Tab Take 3 Tabs by mouth every bedtime. 270 Tab 0   • pantoprazole (PROTONIX) 40 MG Tablet Delayed Response TAKE ONE TABLET BY MOUTH ONCE DAILY 90 Tab 0   • diazepam (VALIUM) 5 MG Tab Take 0.5 Tabs by mouth every 6 hours as needed (muscle cramps). 60 Tab 0   • ezetimibe (ZETIA) 10 MG Tab Take 1 Tab by mouth every day. 90 Tab 0   • diltiazem (DILACOR XR) 180 MG XR capsule TAKE ONE CAPSULE BY MOUTH ONCE DAILY 30 Cap 0   • fluticasone (FLONASE) 50 MCG/ACT nasal spray Spray 2 Sprays in nose 2 times a day. 1 Bottle 3   • chlorhexidine (PERIDEX) 0.12 % Solution Take 15 mL by mouth 2 times a day. 946 mL 0   • Esomeprazole Magnesium (NEXIUM 24HR) 20 MG Tablet Delayed Response Take  by mouth.     • clotrimazole-betamethasone (LOTRISONE) 1-0.05 % Cream Apply 1 Application to affected area(s) 2 times a day.     • doxycycline (VIBRAMYCIN) 100 MG Tab Take 100 mg by mouth 2 times a day. Continuous.     • Erlotinib HCl (TARCEVA) 100 MG Tab Take 100 mg by mouth every evening.     • tamsulosin (FLOMAX) 0.4 MG capsule Take 0.4 mg by mouth ONE-HALF HOUR AFTER BREAKFAST.     • diltiazem CD (CARDIZEM CD) 180 MG CAPSULE SR 24 HR Take 180 mg by mouth every day.     • celecoxib (CELEBREX) 200 MG Cap Take 200 mg by mouth 1 time daily as needed for Moderate Pain.     • clindamycin (CLEOCIN T) 1 % Gel Apply 1 Application to affected area(s) 2 times a day.     • ferrous sulfate 325 (65 FE) MG tablet Take 325 mg by mouth every day.     • latanoprost (XALATAN) 0.005 % Solution Place 1 Drop in both eyes every evening.     • pantoprazole (PROTONIX) 40 MG Tablet Delayed Response Take 1 Tab by mouth every day. 90 Tab 0   • hydrocodone-acetaminophen (NORCO) 5-325 MG Tab per tablet Take 1-2 Tabs by mouth every four hours as needed. Indications: Moderate to Moderately Severe Pain     • Multiple Vitamins-Minerals (MULTIVITAMIN PO) Take 1 Tab by mouth  every day.     • vitamin D (CHOLECALCIFEROL) 1000 UNIT TABS Take 1,000 Units by mouth every day.     • azithromycin (ZITHROMAX) 250 MG Tab Take 2 tabs po now then one tab po q24h until finished (Patient not taking: Reported on 6/15/2017) 6 Tab 0   • albuterol 108 (90 BASE) MCG/ACT Aero Soln inhalation aerosol Inhale 2 Puffs by mouth every 6 hours as needed for Shortness of Breath. 8.5 g 3   • benzonatate (TESSALON) 100 MG Cap Take 1 Cap by mouth 3 times a day as needed for Cough. (Patient not taking: Reported on 6/15/2017) 60 Cap 3     No current facility-administered medications for this visit.     He  has a past medical history of HTN (hypertension), benign; Mild cognitive impairment; Hyperlipidemia; Paresthesias; Depression; Anxiety; DJD (degenerative joint disease); MEDICAL HOME (2012); Hypertension; Heart burn; Indigestion; Glaucoma; Unspecified cataract; Dementia; Arthritis; Carcinoma in situ of respiratory system; and Cancer (CMS-[facility]) ().  He  has past surgical history that includes cataract extraction (Bilateral, ); other (Left, ); hemorrhoidectomy (08); prostatectomy, radial (); prostatectomy, radical retro; recovery (3/13/2015); colonoscopy - endo (N/A, 2016); and penetrating keratoplasty (Left, 2016).  Social History   Substance Use Topics   • Smoking status: Never Smoker    • Smokeless tobacco: Never Used   • Alcohol Use: No     Social History     Social History Narrative    No known exposure to asbestos, dyes, chemicals, or pesticides.   for 53 years.  Moved to  in .  2 daughters and 1 son.  1 daughter passed away from breast cancer.      Family History   Problem Relation Age of Onset   • Cancer Daughter 49     Breast     Family Status   Relation Status Death Age   • Daughter  50   • Mother     • Father     • Sister     • Maternal Grandmother     • Maternal Grandfather     • Paternal Grandmother    "  • Paternal Grandfather           ROS  Please see history of present illness    All other systems reviewed and are negative     Objective:     Blood pressure 138/80, pulse 69, temperature 36.1 °C (97 °F), height 1.651 m (5' 5\"), weight 63.866 kg (140 lb 12.8 oz), SpO2 95 %. Body mass index is 23.43 kg/(m^2).  Physical Exam:    Constitutional: Alert, no distress.  Skin: Warm, dry, good turgor, no rashes in visible areas.  Eye: Equal, round and reactive, conjunctiva clear, lids normal.  ENMT: Lips without lesions, good dentition, oropharynx clear.  Neck: Trachea midline, no masses, no thyromegaly. No cervical or supraclavicular lymphadenopathy.  Respiratory: Unlabored respiratory effort, lungs clear to auscultation, no wheezes, no ronchi.  Cardiovascular: Normal S1, S2, no murmur, no edema.  Abdomen: Soft, non-tender, no masses, no hepatosplenomegaly.  Psych: Alert and oriented x3, normal affect and mood.          Assessment and Plan:   The following treatment plan was discussed      1. Hypotension due to drugs  Hold BP meds   Patient was given instructions to make a two-week blood pressure log  To measure his blood pressure morning and evening same time  Then send results back to us  We will consider specific management at that time      2. Pure hypercholesterolemia  He was adamant about repeating labs  Although he had normal lipid profile 2 months ago     LIPID PROFILE; Future    3. Cramp in lower leg  Patient has been stable with current management  We will make no changes for now    4. HTN (hypertension), benign  Stop BP meds  Return 2 wk BP log to me  He was given  Hypertension precautions      HEALTH MAINTENANCE: due for AWV    Instructed to Follow up in clinic or ER for worsening symptoms, difficulty breathing, lack of expected recovery, or should new symptoms or problems arise.    Followup: Return in about 4 weeks (around 2017) for Reevaluation.       Once again this medical record contains text " that has been entered with the assistance of computer voice recognition and dictation software.  Therefore, it may contain unintended errors in text, spelling, punctuation, or grammar

## 2017-06-15 NOTE — ASSESSMENT & PLAN NOTE
Patient states that he is really worried about his cholesterol level because he eats 5 chicken nuggets a day. He also eats almonds nonstop. Although he had a normal cholesterol level II months ago he wants this rechecked.

## 2017-06-15 NOTE — ASSESSMENT & PLAN NOTE
Always gets cramps at night  He's been taking Valium when necessary.  He does get good relief with Valium sometimes he has to take 2 tablets he states

## 2017-06-15 NOTE — ASSESSMENT & PLAN NOTE
Patient states that he stopped his hydrochlorothiazide as well as diltiazem for the past 2 days because he had low blood pressure measured at 100/50 by his nephew who is a physical therapist.

## 2017-06-19 ENCOUNTER — HOSPITAL ENCOUNTER (OUTPATIENT)
Dept: LAB | Facility: MEDICAL CENTER | Age: 82
End: 2017-06-19
Attending: FAMILY MEDICINE
Payer: MEDICARE

## 2017-06-19 DIAGNOSIS — E78.00 PURE HYPERCHOLESTEROLEMIA: ICD-10-CM

## 2017-06-19 LAB
CHOLEST SERPL-MCNC: 180 MG/DL (ref 100–199)
HDLC SERPL-MCNC: 59 MG/DL
LDLC SERPL CALC-MCNC: 106 MG/DL
TRIGL SERPL-MCNC: 73 MG/DL (ref 0–149)

## 2017-06-19 PROCEDURE — 80061 LIPID PANEL: CPT

## 2017-06-19 PROCEDURE — 36415 COLL VENOUS BLD VENIPUNCTURE: CPT

## 2017-07-26 NOTE — TELEPHONE ENCOUNTER
ESTABLISHED PATIENT PRE-VISIT PLANNING     Note: Patient will not be contacted if there is no indication to call.     1.  Reviewed notes from the last few office visits within the medical group: Yes    2.  If any orders were placed at last visit or intended to be done for this visit (i.e. 6 mos follow-up), do we have Results/Consult Notes?        •  Labs - Labs were not ordered at last office visit.       •  Imaging - Imaging was not ordered at last office visit.       •  Referrals - No referrals were ordered at last office visit.    3. Is this appointment scheduled as a Hospital Follow-Up? No    4.  Immunizations were updated in Epic using WebIZ?: Epic matches WebIZ       •  Web Iz Recommendations: HEPATITIS A  and TD    5.  Patient is due for the following Health Maintenance Topics:   Health Maintenance Due   Topic Date Due   • Annual Wellness Visit  03/15/2017       - Patient is up-to-date on all Health Maintenance topics. No records have been requested at this time.    6.  Patient was NOT informed to arrive 15 min prior to their scheduled appointment and bring in their medication bottles.

## 2017-07-27 NOTE — ASSESSMENT & PLAN NOTE
Could not tollerrate lipitor due to leg cramps  Doing fine on Ezetimibe    Results for ELIZABETH CLEANING (MRN [id number]) as of 7/27/2017 13:56   Ref. Range 6/19/2017 08:28   Cholesterol,Tot Latest Ref Range: 100-199 mg/dL 180   Triglycerides Latest Ref Range: 0-149 mg/dL 73   HDL Latest Ref Range: >=40 mg/dL 59   LDL Latest Ref Range: <100 mg/dL 106 (H)

## 2017-07-27 NOTE — PROGRESS NOTES
This medical record contains text that has been entered with the assistance of computer voice recognition and dictation software.  Therefore, it may contain unintended errors in text, spelling, punctuation, or grammar    Chief Complaint   Patient presents with   • Other     see reason for visit       Elizabeth Gardiner is a 86 y.o. male here evaluation and management of: HTN,       HPI:     Hyperlipidemia  Could not tollerrate lipitor due to leg cramps  Doing fine on Ezetimibe    Results for ELIZABETH GARDINER (MRN [id number]) as of 7/27/2017 13:56   Ref. Range 6/19/2017 08:28   Cholesterol,Tot Latest Ref Range: 100-199 mg/dL 180   Triglycerides Latest Ref Range: 0-149 mg/dL 73   HDL Latest Ref Range: >=40 mg/dL 59   LDL Latest Ref Range: <100 mg/dL 106 (H)       Dementia  Has not been taking aricept due to insurrance not covering cost  The main issue is urinary loss. He has not had any behavioral changes.  He has adequate family support at home    HTN (hypertension), benign  We've stopped the patient's medications over month ago due to low blood pressure readings at home. However, now it's consistently averaging 150/90.      Patient also  denied chest pain, headache, change in vision, dyspnea on exertion, shortness of breath, PND, back pain, numbness or tingling anywhere. He is tolerating his medication well, he denies any lightheadedness, no tunnel vision, no syncopal episodes, no fatigue, no leg weakness no falls.      Current medicines (including changes today)  Current Outpatient Prescriptions   Medication Sig Dispense Refill   • diltiazem CD (CARDIZEM CD) 180 MG CAPSULE SR 24 HR Take 1 Cap by mouth every day. 90 Cap 0   • memantine (NAMENDA) 5 MG Tab Take 1 Tab by mouth every day. 90 Tab 0   • donepezil (ARICEPT) 5 MG Tab TAKE ONE TABLET BY MOUTH ONCE DAILY 90 Tab 0   • gabapentin (NEURONTIN) 300 MG Cap TAKE ONE CAPSULE BY MOUTH THREE TIMES DAILY 270 Cap 0   • polyethylene glycol/lytes (MIRALAX) Pack DISSOLVE  ONE PACKET IN WATER AND DRINK ONCE DAILY AS NEEDED FOR CONSTIPATION 90 Each 0   • hydrochlorothiazide (MICROZIDE) 12.5 MG capsule TAKE ONE CAPSULE BY MOUTH ONCE DAILY 90 Cap 0   • clobetasol (TEMOVATE) 0.05 % Cream      • TARCEVA 25 MG Tab      • timolol (TIMOPTIC) 0.5 % Solution      • ropinirole (REQUIP) 1 MG Tab Take 3 Tabs by mouth every bedtime. 270 Tab 0   • pantoprazole (PROTONIX) 40 MG Tablet Delayed Response TAKE ONE TABLET BY MOUTH ONCE DAILY 90 Tab 0   • diazepam (VALIUM) 5 MG Tab Take 0.5 Tabs by mouth every 6 hours as needed (muscle cramps). 60 Tab 0   • ezetimibe (ZETIA) 10 MG Tab Take 1 Tab by mouth every day. 90 Tab 0   • diltiazem (DILACOR XR) 180 MG XR capsule TAKE ONE CAPSULE BY MOUTH ONCE DAILY 30 Cap 0   • fluticasone (FLONASE) 50 MCG/ACT nasal spray Spray 2 Sprays in nose 2 times a day. 1 Bottle 3   • chlorhexidine (PERIDEX) 0.12 % Solution Take 15 mL by mouth 2 times a day. 946 mL 0   • Esomeprazole Magnesium (NEXIUM 24HR) 20 MG Tablet Delayed Response Take  by mouth.     • clotrimazole-betamethasone (LOTRISONE) 1-0.05 % Cream Apply 1 Application to affected area(s) 2 times a day.     • doxycycline (VIBRAMYCIN) 100 MG Tab Take 100 mg by mouth 2 times a day. Continuous.     • Erlotinib HCl (TARCEVA) 100 MG Tab Take 100 mg by mouth every evening.     • tamsulosin (FLOMAX) 0.4 MG capsule Take 0.4 mg by mouth ONE-HALF HOUR AFTER BREAKFAST.     • celecoxib (CELEBREX) 200 MG Cap Take 200 mg by mouth 1 time daily as needed for Moderate Pain.     • clindamycin (CLEOCIN T) 1 % Gel Apply 1 Application to affected area(s) 2 times a day.     • ferrous sulfate 325 (65 FE) MG tablet Take 325 mg by mouth every day.     • latanoprost (XALATAN) 0.005 % Solution Place 1 Drop in both eyes every evening.     • pantoprazole (PROTONIX) 40 MG Tablet Delayed Response Take 1 Tab by mouth every day. 90 Tab 0   • hydrocodone-acetaminophen (NORCO) 5-325 MG Tab per tablet Take 1-2 Tabs by mouth every four hours as needed.  Indications: Moderate to Moderately Severe Pain     • Multiple Vitamins-Minerals (MULTIVITAMIN PO) Take 1 Tab by mouth every day.     • vitamin D (CHOLECALCIFEROL) 1000 UNIT TABS Take 1,000 Units by mouth every day.     • azithromycin (ZITHROMAX) 250 MG Tab Take 2 tabs po now then one tab po q24h until finished (Patient not taking: Reported on 6/15/2017) 6 Tab 0   • albuterol 108 (90 BASE) MCG/ACT Aero Soln inhalation aerosol Inhale 2 Puffs by mouth every 6 hours as needed for Shortness of Breath. 8.5 g 3   • benzonatate (TESSALON) 100 MG Cap Take 1 Cap by mouth 3 times a day as needed for Cough. (Patient not taking: Reported on 6/15/2017) 60 Cap 3     No current facility-administered medications for this visit.     He  has a past medical history of HTN (hypertension), benign; Mild cognitive impairment; Hyperlipidemia; Paresthesias; Depression; Anxiety; DJD (degenerative joint disease); MEDICAL HOME (2012); Hypertension; Heart burn; Indigestion; Glaucoma; Unspecified cataract; Dementia; Arthritis; Carcinoma in situ of respiratory system; and Cancer (CMS-[facility]) ().  He  has past surgical history that includes cataract extraction (Bilateral, ); other (Left, ); hemorrhoidectomy (08); prostatectomy, radial (); prostatectomy, radical retro; recovery (3/13/2015); colonoscopy - endo (N/A, 2016); and penetrating keratoplasty (Left, 2016).  Social History   Substance Use Topics   • Smoking status: Never Smoker    • Smokeless tobacco: Never Used   • Alcohol Use: No     Social History     Social History Narrative    No known exposure to asbestos, dyes, chemicals, or pesticides.   for 53 years.  Moved to [place] in .  2 daughters and 1 son.  1 daughter passed away from breast cancer.      Family History   Problem Relation Age of Onset   • Cancer Daughter 49     Breast     Family Status   Relation Status Death Age   • Daughter  50   • Mother     • Father     •  "Sister     • Maternal Grandmother     • Maternal Grandfather     • Paternal Grandmother     • Paternal Grandfather           ROS    Please see hpi     All other systems reviewed and are negative     Objective:     Blood pressure 150/82, pulse 82, temperature 36.7 °C (98.1 °F), height 1.651 m (5' 5\"), weight 64.229 kg (141 lb 9.6 oz), SpO2 95 %. Body mass index is 23.56 kg/(m^2).  Physical Exam:    Constitutional: Alert, no distress.  Skin: Warm, dry, good turgor, no rashes in visible areas.  Eye: Equal, round and reactive, conjunctiva clear, lids normal.  ENMT: Lips without lesions, good dentition, oropharynx clear.  Neck: Trachea midline, no masses, no thyromegaly. No cervical or supraclavicular lymphadenopathy.  Respiratory: Unlabored respiratory effort, lungs clear to auscultation, no wheezes, no ronchi.  Cardiovascular: Normal S1, S2, no murmur, no edema.  Abdomen: Soft, non-tender, no masses, no hepatosplenomegaly.  Psych: Alert and oriented x3, normal affect and mood.          Assessment and Plan:   The following treatment plan was discussed      1. HTN (hypertension), benign  Restart Diltiazem 180mg   Patient was given instructions to make a two-week blood pressure log  To measure his blood pressure morning and evening same time  Then send results back to us  We will consider specific management at that time      2. Pure hypercholesterolemia  Patient has been stable with current management  We will make no changes for now      3. Weight loss  stable    4. RLS (restless legs syndrome)  Patient has been stable with current management  We will make no changes for now      5. Dementia associated with other underlying disease without behavioral disturbance  We will try Namenda  All side effects explained    - memantine (NAMENDA) 5 MG Tab; Take 1 Tab by mouth every day.  Dispense: 90 Tab; Refill: 0      HEALTH MAINTENANCE:due for AWV    Instructed to Follow up in clinic or ER " for worsening symptoms, difficulty breathing, lack of expected recovery, or should new symptoms or problems arise.    Followup: Return in about 2 weeks (around 8/10/2017) for BP Check.       Once again this medical record contains text that has been entered with the assistance of computer voice recognition and dictation software.  Therefore, it may contain unintended errors in text, spelling, punctuation, or grammar

## 2017-07-27 NOTE — ASSESSMENT & PLAN NOTE
We've stopped the patient's medications over month ago due to low blood pressure readings at home. However, now it's consistently averaging 150/90.      Patient also  denied chest pain, headache, change in vision, dyspnea on exertion, shortness of breath, PND, back pain, numbness or tingling anywhere. He is tolerating his medication well, he denies any lightheadedness, no tunnel vision, no syncopal episodes, no fatigue, no leg weakness no falls.

## 2017-07-27 NOTE — MR AVS SNAPSHOT
"        Benjie Rigoberto Gardiner   2017 1:40 PM   Office Visit   MRN: [id number]    Department:  [address] [place] [address]   Dept Phone:  [phone]    Description:  Male : 3/1/1931   Provider:  Asher Garza M.D.           Reason for Visit     Other see reason for visit      Allergies as of 2017     Allergen Noted Reactions    Lipitor [Atorvastatin Calcium] 2009       Leg cramps      You were diagnosed with     HTN (hypertension), benign   [526656]       Pure hypercholesterolemia   [272.0.ICD-9-CM]       Weight loss   [007180]       RLS (restless legs syndrome)   [791432]       Dementia associated with other underlying disease without behavioral disturbance   [9870598]         Vital Signs     Blood Pressure Pulse Temperature Height Weight Body Mass Index    150/82 mmHg 82 36.7 °C (98.1 °F) 1.651 m (5' 5\") 64.229 kg (141 lb 9.6 oz) 23.56 kg/m2    Oxygen Saturation Smoking Status                95% Never Smoker           Basic Information     Date Of Birth Sex Race Ethnicity Preferred Language    3/1/1931 Male Other Non- English      Problem List              ICD-10-CM Priority Class Noted - Resolved    HTN (hypertension), benign I10   Unknown - Present    Mild cognitive impairment G31.84   Unknown - Present    Hyperlipidemia E78.5   Unknown - Present    DJD (degenerative joint disease) M19.90   Unknown - Present    Paresthesias R20.2   Unknown - Present    Pulmonary nodule R91.1   2015 - Present    RLS (restless legs syndrome) G25.81   3/2/2015 - Present    Solitary pulmonary nodule R91.1   3/13/2015 - Present    Lung cancer (CMS-HCC) C34.90   2015 - Present    Primary pulmonary hypertension (CMS-HCC) I27.0 High  2015 - Present    Gastroesophageal reflux disease without esophagitis K21.9   2015 - Present    Chronic rhinitis J31.0   2015 - Present    Weight loss R63.4   2015 - Present    Erosive gastritis K29.60   12/10/2015 - Present    Cramp in lower leg R25.2 "   2/24/2016 - Present    Normocytic anemia D64.9   2/24/2016 - Present    Secondary corneal edema H18.239   4/5/2016 - Present    Left hip pain M25.552   6/27/2016 - Present    Seborrheic dermatitis of scalp L21.9   6/27/2016 - Present    Rash on both feet R21   6/27/2016 - Present    GIB (gastrointestinal bleeding) K92.2   9/8/2016 - Present    SBO (small bowel obstruction) (CMS-HCC) K56.69   9/9/2016 - Present    Dementia F03.90   9/10/2016 - Present    Anemia D64.9   9/22/2016 - Present    Hypocalcemia E83.51   9/22/2016 - Present    ZANDER (acute kidney injury) (CMS-HCC) N17.9   9/22/2016 - Present    Acute nasopharyngitis J00   12/2/2016 - Present    Cough R05   1/10/2017 - Present    Bronchitis J40   4/17/2017 - Present    Preventative health care Z00.00   4/17/2017 - Present    Hypotension due to drugs I95.2   6/15/2017 - Present      Health Maintenance        Date Due Completion Dates    IMM INFLUENZA (1) 9/1/2017 10/24/2016, 10/8/2015, 8/25/2014, 10/8/2013, 9/20/2012, 10/20/2011, 9/7/2010    IMM DTaP/Tdap/Td Vaccine (2 - Td) 10/8/2025 10/8/2015            Current Immunizations     13-VALENT PCV PREVNAR 10/8/2015    Influenza TIV (IM) 9/7/2010    Influenza Vaccine Adult HD 10/24/2016, 10/8/2015    Influenza Vaccine Quad Inj (Pf) 8/25/2014, 10/20/2011    Influenza Vaccine Quad Inj (Preserved) 10/8/2013, 9/20/2012    Pneumococcal polysaccharide vaccine (PPSV-23) 9/20/2012    SHINGLES VACCINE 10/8/2015    Tdap Vaccine 10/8/2015      Below and/or attached are the medications your provider expects you to take. Review all of your home medications and newly ordered medications with your provider and/or pharmacist. Follow medication instructions as directed by your provider and/or pharmacist. Please keep your medication list with you and share with your provider. Update the information when medications are discontinued, doses are changed, or new medications (including over-the-counter products) are added; and carry  medication information at all times in the event of emergency situations     Allergies:  LIPITOR - (reactions not documented)               Medications  Valid as of: July 27, 2017 -  3:12 PM    Generic Name Brand Name Tablet Size Instructions for use    Albuterol Sulfate (Aero Soln) albuterol 108 (90 BASE) MCG/ACT Inhale 2 Puffs by mouth every 6 hours as needed for Shortness of Breath.        Azithromycin (Tab) ZITHROMAX 250 MG Take 2 tabs po now then one tab po q24h until finished        Benzonatate (Cap) TESSALON 100 MG Take 1 Cap by mouth 3 times a day as needed for Cough.        Celecoxib (Cap) CELEBREX 200 MG Take 200 mg by mouth 1 time daily as needed for Moderate Pain.        Chlorhexidine Gluconate (Solution) PERIDEX 0.12 % Take 15 mL by mouth 2 times a day.        Cholecalciferol (Tab) cholecalciferol 1000 UNIT Take 1,000 Units by mouth every day.        Clindamycin Phosphate (Gel) CLEOCIN T 1 % Apply 1 Application to affected area(s) 2 times a day.        Clobetasol Propionate (Cream) TEMOVATE 0.05 %         Clotrimazole-Betamethasone (Cream) LOTRISONE 1-0.05 % Apply 1 Application to affected area(s) 2 times a day.        DiazePAM (Tab) VALIUM 5 MG Take 0.5 Tabs by mouth every 6 hours as needed (muscle cramps).        DilTIAZem HCl (CAPSULE SR 24 HR) DILACOR  MG TAKE ONE CAPSULE BY MOUTH ONCE DAILY        DilTIAZem HCl Coated Beads (CAPSULE SR 24 HR) CARDIZEM  MG Take 1 Cap by mouth every day.        Donepezil HCl (Tab) ARICEPT 5 MG TAKE ONE TABLET BY MOUTH ONCE DAILY        Doxycycline Hyclate (Tab) VIBRAMYCIN 100 MG Take 100 mg by mouth 2 times a day. Continuous.        Erlotinib HCl (Tab) Erlotinib HCl 100 MG Take 100 mg by mouth every evening.        Erlotinib HCl (Tab) TARCEVA 25 MG         Esomeprazole Magnesium (Tablet Delayed Response) Esomeprazole Magnesium 20 MG Take  by mouth.        Ezetimibe (Tab) ZETIA 10 MG Take 1 Tab by mouth every day.        Ferrous Sulfate (Tab) ferrous  sulfate 325 (65 FE) MG Take 325 mg by mouth every day.        Fluticasone Propionate (Suspension) FLONASE 50 MCG/ACT Spray 2 Sprays in nose 2 times a day.        Gabapentin (Cap) NEURONTIN 300 MG TAKE ONE CAPSULE BY MOUTH THREE TIMES DAILY        HydroCHLOROthiazide (Cap) MICROZIDE 12.5 MG TAKE ONE CAPSULE BY MOUTH ONCE DAILY        Hydrocodone-Acetaminophen (Tab) NORCO 5-325 MG Take 1-2 Tabs by mouth every four hours as needed. Indications: Moderate to Moderately Severe Pain        Latanoprost (Solution) XALATAN 0.005 % Place 1 Drop in both eyes every evening.        Memantine HCl (Tab) NAMENDA 5 MG Take 1 Tab by mouth every day.        Multiple Vitamins-Minerals   Take 1 Tab by mouth every day.        Pantoprazole Sodium (Tablet Delayed Response) PROTONIX 40 MG Take 1 Tab by mouth every day.        Pantoprazole Sodium (Tablet Delayed Response) PROTONIX 40 MG TAKE ONE TABLET BY MOUTH ONCE DAILY        Polyethylene Glycol 3350 (Pack) MIRALAX  DISSOLVE ONE PACKET IN WATER AND DRINK ONCE DAILY AS NEEDED FOR CONSTIPATION        ROPINIRole HCl (Tab) REQUIP 1 MG Take 3 Tabs by mouth every bedtime.        Tamsulosin HCl (Cap) FLOMAX 0.4 MG Take 0.4 mg by mouth ONE-HALF HOUR AFTER BREAKFAST.        Timolol Maleate (Solution) TIMOPTIC 0.5 %         .                 Medicines prescribed today were sent to:     [facility] [address] [place] [address], [place] - [address] [facility]    [address] [facility] [place] [address]    Phone: [phone] Fax: [phone]    Open 24 Hours?: No      Medication refill instructions:       If your prescription bottle indicates you have medication refills left, it is not necessary to call your provider’s office. Please contact your pharmacy and they will refill your medication.    If your prescription bottle indicates you do not have any refills left, you may request refills at any time through one of the following ways: The online [employer] system (except Urgent Care), by calling your provider’s office,  or by asking your pharmacy to contact your provider’s office with a refill request. Medication refills are processed only during regular business hours and may not be available until the next business day. Your provider may request additional information or to have a follow-up visit with you prior to refilling your medication.   *Please Note: Medication refills are assigned a new Rx number when refilled electronically. Your pharmacy may indicate that no refills were authorized even though a new prescription for the same medication is available at the pharmacy. Please request the medicine by name with the pharmacy before contacting your provider for a refill.        Other Notes About Your Plan     This patient has an appointment on 03/14/2016. At the beginning of the year all chronic conditions should be assessed and Documented in conjunction with any other identified concerns during the visit.     Malignant neoplasm of upper lobe rt bronchus:C34.11  Primary pulmonary HTN:I27.0  Exudative age related macular degeneration:H35.32  Atherosclerosis of aorta:I70.0  Other disorders of pituitary gland:E23.6           MyChart Access Code: Activation code not generated  Current SaySwap Status: Active

## 2017-07-27 NOTE — ASSESSMENT & PLAN NOTE
Has not been taking aricept due to insurrance not covering cost  The main issue is urinary loss. He has not had any behavioral changes.  He has adequate family support at home

## 2017-08-17 NOTE — TELEPHONE ENCOUNTER
From: Benjie Gardiner  To: Asher Garza M.D.  Sent: 8/16/2017 2:17 PM PDT  Subject: Medication Renewal Request    Original authorizing provider: ELISSA Ledesma would like a refill of the following medications:  ezetimibe (ZETIA) 10 MG Tab [Asher Garza M.D.]    Preferred pharmacy: [facility] [address] [place] [address], [place] [address]    Comment:

## 2017-10-19 PROBLEM — Z23 NEED FOR VACCINATION: Status: ACTIVE | Noted: 2017-01-01

## 2017-10-19 NOTE — TELEPHONE ENCOUNTER
ESTABLISHED PATIENT PRE-VISIT PLANNING     Note: Patient will not be contacted if there is no indication to call.     1.  Reviewed notes from the last few office visits within the medical group: Yes    2.  If any orders were placed at last visit or intended to be done for this visit (i.e. 6 mos follow-up), do we have Results/Consult Notes?        •  Labs - Labs were not ordered at last office visit.   Note: If patient appointment is for lab review and patient did not complete labs,                check with provider if OK to reschedule patient until labs completed.       •  Imaging - Imaging was not ordered at last office visit.       •  Referrals - No referrals were ordered at last office visit.    3. Is this appointment scheduled as a Hospital Follow-Up? No    4.  Immunizations were updated in Epic using WebIZ?: Epic matches WebIZ       •  Web [employer] Recommendations: FLU, HEPATITIS A  and TD    5.  Patient is due for the following Health Maintenance Topics:   Health Maintenance Due   Topic Date Due   • Annual Wellness Visit  03/15/2017   • IMM INFLUENZA (1) 09/01/2017       - Patient is up-to-date on all Health Maintenance topics. No records have been requested at this time.    6.  Patient was NOT informed to arrive 15 min prior to their scheduled appointment and bring in their medication bottles.

## 2017-10-19 NOTE — PROGRESS NOTES
This medical record contains text that has been entered with the assistance of computer voice recognition and dictation software.  Therefore, it may contain unintended errors in text, spelling, punctuation, or grammar    Chief Complaint   Patient presents with   • Other     see reason for visit       Benjie Gardiner is a 86 y.o. male here evaluation and management of: Dementia, htn, RLS, med refill      HPI:     Dementia  The patient did not start the Aricept due to insurance not covering it. We did switch to Namenda overall he seems to be tolerating it. There have been no changes in personality, there is no visual or auditory hallucinations. He has great support with family at home with his wife and son and daughter-in-law.    HTN (hypertension), benign  We stopped then restarted his medications due to an episode of hypotension.   Diltiazem 180mg  HCTZ 12.5mg    The patient has been tollerating the BP meds without any issues. No tunnel vision, no cough, no changes in vision, no lightheadedness, no fatigue, no syncopal or presyncopal episodes, no edema, no new rashes.         RLS (restless legs syndrome)  He appears to have some relief with the Requip, if he doesn't take it the leg movement and pain is miserable.    Current medicines (including changes today)  Current Outpatient Prescriptions   Medication Sig Dispense Refill   • diltiazem CD (CARDIZEM CD) 180 MG CAPSULE SR 24 HR Take 1 Cap by mouth every day. 90 Cap 0   • ropinirole (REQUIP) 1 MG Tab Take 3 Tabs by mouth every bedtime. 270 Tab 0   • hydrochlorothiazide (MICROZIDE) 12.5 MG capsule TAKE ONE CAPSULE BY MOUTH ONCE DAILY 90 Cap 0   • pantoprazole (PROTONIX) 40 MG Tablet Delayed Response TAKE ONE TABLET BY MOUTH ONCE DAILY 90 Tab 3   • ezetimibe (ZETIA) 10 MG Tab Take 1 Tab by mouth every day. 90 Tab 0   • acetaminophen-codeine #3 (TYLENOL #3) 300-30 MG Tab Take 1-2 Tabs by mouth every 24 hours. 90 Tab 0   • gabapentin (NEURONTIN) 300 MG Cap Take 1  Cap by mouth 3 times a day. 270 Cap 0   • memantine (NAMENDA) 5 MG Tab Take 1 Tab by mouth every day. 90 Tab 0   • clobetasol (TEMOVATE) 0.05 % Cream      • TARCEVA 25 MG Tab      • timolol (TIMOPTIC) 0.5 % Solution      • diltiazem (DILACOR XR) 180 MG XR capsule TAKE ONE CAPSULE BY MOUTH ONCE DAILY 30 Cap 0   • chlorhexidine (PERIDEX) 0.12 % Solution Take 15 mL by mouth 2 times a day. 946 mL 0   • clotrimazole-betamethasone (LOTRISONE) 1-0.05 % Cream Apply 1 Application to affected area(s) 2 times a day.     • doxycycline (VIBRAMYCIN) 100 MG Tab Take 100 mg by mouth 2 times a day. Continuous.     • Erlotinib HCl (TARCEVA) 100 MG Tab Take 100 mg by mouth every evening.     • tamsulosin (FLOMAX) 0.4 MG capsule Take 0.4 mg by mouth ONE-HALF HOUR AFTER BREAKFAST.     • celecoxib (CELEBREX) 200 MG Cap Take 200 mg by mouth 1 time daily as needed for Moderate Pain.     • clindamycin (CLEOCIN T) 1 % Gel Apply 1 Application to affected area(s) 2 times a day.     • ferrous sulfate 325 (65 FE) MG tablet Take 325 mg by mouth every day.     • latanoprost (XALATAN) 0.005 % Solution Place 1 Drop in both eyes every evening.     • hydrocodone-acetaminophen (NORCO) 5-325 MG Tab per tablet Take 1-2 Tabs by mouth every four hours as needed. Indications: Moderate to Moderately Severe Pain     • Multiple Vitamins-Minerals (MULTIVITAMIN PO) Take 1 Tab by mouth every day.     • vitamin D (CHOLECALCIFEROL) 1000 UNIT TABS Take 1,000 Units by mouth every day.     • fluticasone (FLONASE) 50 MCG/ACT nasal spray USE TWO SPRAY(S) IN EACH NOSTRIL TWICE DAILY 1 Bottle 0   • diazepam (VALIUM) 5 MG Tab Take 1/2 tab po q 24h prn cramps 30 Tab 0   • polyethylene glycol/lytes (MIRALAX) Pack DISSOLVE ONE PACKET IN WATER AND DRINK ONCE A DAY AS NEEDED FOR CONSTIPATION 90 Each 0   • azithromycin (ZITHROMAX) 250 MG Tab Take 2 tabs po now then one tab po q24h until finished (Patient not taking: Reported on 6/15/2017) 6 Tab 0   • albuterol 108 (90 BASE)  "MCG/ACT Aero Soln inhalation aerosol Inhale 2 Puffs by mouth every 6 hours as needed for Shortness of Breath. 8.5 g 3   • benzonatate (TESSALON) 100 MG Cap Take 1 Cap by mouth 3 times a day as needed for Cough. (Patient not taking: Reported on 6/15/2017) 60 Cap 3     No current facility-administered medications for this visit.      He  has a past medical history of Anxiety; Arthritis; Cancer (CMS-HCC) (); Carcinoma in situ of respiratory system; Dementia; Depression; DJD (degenerative joint disease); Glaucoma; Heart burn; HTN (hypertension), benign; Hyperlipidemia; Hypertension; Indigestion; MEDICAL HOME (2012); Mild cognitive impairment; Paresthesias; and Unspecified cataract.  He  has a past surgical history that includes cataract extraction (Bilateral, ); other (Left, ); hemorrhoidectomy (08); ostatectomy, radial (); ostatectomy, radical retro; recovery (3/13/2015); colonoscopy - endo (N/A, 2016); and penetrating keratoplasty (Left, 2016).  Social History   Substance Use Topics   • Smoking status: Never Smoker   • Smokeless tobacco: Never Used   • Alcohol use No     Social History     Social History Narrative    No known exposure to asbestos, dyes, chemicals, or pesticides.   for 53 years.  [place]oved to  [place]  2 daughters and 1 son.  1 daughter passed away from breast cancer.      Family History   Problem Relation Age of Onset   • Cancer Daughter 49     Breast     Family Status   Relation Status   • Daughter  at age 50   • Mother    • Father    • Sister    • Maternal Grandmother    • Maternal Grandfather    • Paternal Grandmother    • Paternal Grandfather          ROS    Please see hpi     All other systems reviewed and are negative     Objective:     Blood pressure 130/78, pulse 72, temperature 36.3 °C (97.3 °F), height 1.651 m (5' 5\"), weight 63.4 kg (139 lb 12.8 oz), SpO2 95 %. Body mass index is 23.26 " kg/m².  Physical Exam:    Constitutional: Alert, no distress.  Skin: Warm, dry, good turgor, no rashes in visible areas.  Eye: Equal, round and reactive, conjunctiva clear, lids normal.  ENMT: Lips without lesions, good dentition, oropharynx clear.  Neck: Trachea midline, no masses, no thyromegaly. No cervical or supraclavicular lymphadenopathy.  Respiratory: Unlabored respiratory effort, lungs clear to auscultation, no wheezes, no ronchi.  Cardiovascular: Normal S1, S2, no murmur, no edema.  Abdomen: Soft, non-tender, no masses, no hepatosplenomegaly.  Psych: Alert and oriented x3, normal affect and mood.          Assessment and Plan:   The following treatment plan was discussed      1. RLS (restless legs syndrome)  Patient has been stable with current management  We will make no changes for now    - ropinirole (REQUIP) 1 MG Tab; Take 3 Tabs by mouth every bedtime.  Dispense: 270 Tab; Refill: 0    2. Gastroesophageal reflux disease without esophagitis     Gerd precautions given (avoid the supine position after eating, avoid tight fitting clothing, head of bed elevation by raisin legs of bed,  avoid eating prior to sleeping, avoid reflux-inducing foods (foods high in fat, chocolate, peppermint, and excessive alcohol, which can decrease LES pressure), promote salivation by chewing gum or lozenges,    - pantoprazole (PROTONIX) 40 MG Tablet Delayed Response; TAKE ONE TABLET BY MOUTH ONCE DAILY  Dispense: 90 Tab; Refill: 3    3. Need for vaccination  Given today    - INFLUENZA VACCINE, HIGH DOSE (65+ ONLY)    4. HTN (hypertension), benign  Patient has been stable with current management  We will make no changes for now    - diltiazem CD (CARDIZEM CD) 180 MG CAPSULE SR 24 HR; Take 1 Cap by mouth every day.  Dispense: 90 Cap; Refill: 0  - hydrochlorothiazide (MICROZIDE) 12.5 MG capsule; TAKE ONE CAPSULE BY MOUTH ONCE DAILY  Dispense: 90 Cap; Refill: 0  - ezetimibe (ZETIA) 10 MG Tab; Take 1 Tab by mouth every day.   Dispense: 90 Tab; Refill: 0    5. Primary osteoarthritis involving multiple joints    - acetaminophen-codeine #3 (TYLENOL #3) 300-30 MG Tab; Take 1-2 Tabs by mouth every 24 hours.  Dispense: 90 Tab; Refill: 0  - gabapentin (NEURONTIN) 300 MG Cap; Take 1 Cap by mouth 3 times a day.  Dispense: 270 Cap; Refill: 0      6. Dementia associated with other underlying disease without behavioral disturbance    - memantine (NAMENDA) 5 MG Tab; Take 1 Tab by mouth every day.  Dispense: 90 Tab; Refill: 0        HEALTH MAINTENANCE:    Instructed to Follow up in clinic or ER for worsening symptoms, difficulty breathing, lack of expected recovery, or should new symptoms or problems arise.    Followup: Return in about 3 months (around 1/19/2018) for Reevaluation.       Once again this medical record contains text that has been entered with the assistance of computer voice recognition and dictation software.  Therefore, it may contain unintended errors in text, spelling, punctuation, or grammar

## 2017-10-19 NOTE — ASSESSMENT & PLAN NOTE
He appears to have some relief with the Requip, if he doesn't take it the leg movement and pain is miserable.

## 2017-10-19 NOTE — ASSESSMENT & PLAN NOTE
We stopped then restarted his medications due to an episode of hypotension.   Diltiazem 180mg  HCTZ 12.5mg    The patient has been tollerating the BP meds without any issues. No tunnel vision, no cough, no changes in vision, no lightheadedness, no fatigue, no syncopal or presyncopal episodes, no edema, no new rashes.

## 2017-10-19 NOTE — ASSESSMENT & PLAN NOTE
The patient did not start the Aricept due to insurance not covering it. We did switch to Namenda overall he seems to be tolerating it. There have been no changes in personality, there is no visual or auditory hallucinations. He has great support with family at home with his wife and son and daughter-in-law.

## 2017-10-21 NOTE — PROGRESS NOTES
Subjective:      Benjie Gardiner is a 86 y.o. male who presents with Toe Injury (2nd right foot, poss infected)    Chief Complaint   Patient presents with   • Toe Injury     2nd right foot, poss infected        - This is a very pleasant 86 y.o. male with complaints of pain swelling Rt 2nd toe after stubbing it 3 days ago.      - Hx HTN, stable on current meds           ALLERGIES:  Lipitor [atorvastatin calcium]     PMH:  Past Medical History:   Diagnosis Date   • Anxiety    • Arthritis    • Cancer (CMS-HCC) 2015    lung   • Carcinoma in situ of respiratory system    • Dementia    • Depression    • DJD (degenerative joint disease)    • Glaucoma    • Heart burn    • HTN (hypertension), benign    • Hyperlipidemia    • Hypertension    • Indigestion    • MEDICAL HOME 11/8/2012   • Mild cognitive impairment    • Paresthesias     chronic in both of his feet   • Unspecified cataract     bilateral IOL        MEDS:    Current Outpatient Prescriptions:   •  mupirocin (BACTROBAN) 2 % Ointment, Apply 1 Application to affected area(s) 2 times a day for 7 days., Disp: 30 g, Rfl: 1  •  sulfamethoxazole-trimethoprim (BACTRIM DS) 800-160 MG tablet, Take 1 Tab by mouth every 12 hours for 7 days., Disp: 14 Tab, Rfl: 0  •  ropinirole (REQUIP) 1 MG Tab, Take 3 Tabs by mouth every bedtime., Disp: 270 Tab, Rfl: 0  •  hydrochlorothiazide (MICROZIDE) 12.5 MG capsule, TAKE ONE CAPSULE BY MOUTH ONCE DAILY, Disp: 90 Cap, Rfl: 0  •  pantoprazole (PROTONIX) 40 MG Tablet Delayed Response, TAKE ONE TABLET BY MOUTH ONCE DAILY, Disp: 90 Tab, Rfl: 3  •  ezetimibe (ZETIA) 10 MG Tab, Take 1 Tab by mouth every day., Disp: 90 Tab, Rfl: 0  •  gabapentin (NEURONTIN) 300 MG Cap, Take 1 Cap by mouth 3 times a day., Disp: 270 Cap, Rfl: 0  •  memantine (NAMENDA) 5 MG Tab, Take 1 Tab by mouth every day., Disp: 90 Tab, Rfl: 0  •  fluticasone (FLONASE) 50 MCG/ACT nasal spray, USE TWO SPRAY(S) IN EACH NOSTRIL TWICE DAILY, Disp: 1 Bottle, Rfl: 0  •  diazepam  (VALIUM) 5 MG Tab, Take 1/2 tab po q 24h prn cramps, Disp: 30 Tab, Rfl: 0  •  polyethylene glycol/lytes (MIRALAX) Pack, DISSOLVE ONE PACKET IN WATER AND DRINK ONCE A DAY AS NEEDED FOR CONSTIPATION, Disp: 90 Each, Rfl: 0  •  clobetasol (TEMOVATE) 0.05 % Cream, , Disp: , Rfl:   •  TARCEVA 25 MG Tab, , Disp: , Rfl:   •  timolol (TIMOPTIC) 0.5 % Solution, , Disp: , Rfl:   •  diltiazem (DILACOR XR) 180 MG XR capsule, TAKE ONE CAPSULE BY MOUTH ONCE DAILY, Disp: 30 Cap, Rfl: 0  •  albuterol 108 (90 BASE) MCG/ACT Aero Soln inhalation aerosol, Inhale 2 Puffs by mouth every 6 hours as needed for Shortness of Breath., Disp: 8.5 g, Rfl: 3  •  chlorhexidine (PERIDEX) 0.12 % Solution, Take 15 mL by mouth 2 times a day., Disp: 946 mL, Rfl: 0  •  clotrimazole-betamethasone (LOTRISONE) 1-0.05 % Cream, Apply 1 Application to affected area(s) 2 times a day., Disp: , Rfl:   •  Erlotinib HCl (TARCEVA) 100 MG Tab, Take 100 mg by mouth every evening., Disp: , Rfl:   •  tamsulosin (FLOMAX) 0.4 MG capsule, Take 0.4 mg by mouth ONE-HALF HOUR AFTER BREAKFAST., Disp: , Rfl:   •  celecoxib (CELEBREX) 200 MG Cap, Take 200 mg by mouth 1 time daily as needed for Moderate Pain., Disp: , Rfl:   •  clindamycin (CLEOCIN T) 1 % Gel, Apply 1 Application to affected area(s) 2 times a day., Disp: , Rfl:   •  ferrous sulfate 325 (65 FE) MG tablet, Take 325 mg by mouth every day., Disp: , Rfl:   •  latanoprost (XALATAN) 0.005 % Solution, Place 1 Drop in both eyes every evening., Disp: , Rfl:   •  hydrocodone-acetaminophen (NORCO) 5-325 MG Tab per tablet, Take 1-2 Tabs by mouth every four hours as needed. Indications: Moderate to Moderately Severe Pain, Disp: , Rfl:   •  Multiple Vitamins-Minerals (MULTIVITAMIN PO), Take 1 Tab by mouth every day., Disp: , Rfl:   •  vitamin D (CHOLECALCIFEROL) 1000 UNIT TABS, Take 1,000 Units by mouth every day., Disp: , Rfl:   •  diltiazem CD (CARDIZEM CD) 180 MG CAPSULE SR 24 HR, Take 1 Cap by mouth every day., Disp: 90  "Cap, Rfl: 0  •  acetaminophen-codeine #3 (TYLENOL #3) 300-30 MG Tab, Take 1-2 Tabs by mouth every 24 hours., Disp: 90 Tab, Rfl: 0  •  benzonatate (TESSALON) 100 MG Cap, Take 1 Cap by mouth 3 times a day as needed for Cough. (Patient not taking: Reported on 6/15/2017), Disp: 60 Cap, Rfl: 3    ** I have documented what I find to be significant in regards to past medical, social, family and surgical history  in my HPI or under PMH/PSH/FH review section, otherwise it is contributory **           HPI    Review of Systems   Constitutional: Negative for chills and fever.   Neurological: Negative for dizziness and focal weakness.          Objective:     /72   Pulse 60   Temp 36.3 °C (97.4 °F)   Resp 18   Ht 1.651 m (5' 5\")   Wt 64 kg (141 lb)   SpO2 97%   BMI 23.46 kg/m²      Physical Exam   Constitutional: He appears well-developed. No distress.   HENT:   Head: Normocephalic and atraumatic.   Eyes: Conjunctivae are normal.   Neck: Neck supple.   Cardiovascular: Regular rhythm.    No murmur heard.  Pulmonary/Chest: Effort normal. No respiratory distress.   Neurological: He is alert. He exhibits normal muscle tone.   Skin: Skin is warm and dry.   Psychiatric: He has a normal mood and affect. Judgment normal.   Nursing note and vitals reviewed.  Rt 2nd toe w/ edema erythema and TTP            Assessment/Plan:         1. Injury of toe on right foot, initial encounter  mupirocin (BACTROBAN) 2 % Ointment    sulfamethoxazole-trimethoprim (BACTRIM DS) 800-160 MG tablet    REFERRAL TO PODIATRY       * says will f/u w/ podiatry for XR      Dx & d/c instructions discussed w/ patient and/or family members. Follow up w/ Prvt Dr or here in 3-4 days if not getting better, sooner if needed,  ER if worse and UC/PCP unavailable.        Possible side effects (i.e. Rash, GI upset/constipation, sedation, elevation of BP or sugars) of any medications given discussed.              "

## 2017-11-10 NOTE — TELEPHONE ENCOUNTER
1. Caller Name: Benjie Gardiner                                         Call Back Number: [phone]        Patient approves a detailed voicemail message: N\A    Patient is stating he is having a lot of cramping in his right leg. He is currently taking diazepam 5 MG HALF of a tablet. He is wondering if he can start taking a full tablet, please advise.

## 2017-11-10 NOTE — TELEPHONE ENCOUNTER
Jose Alberto Sterling M.D.  Debbi Santiago, Med Ass't   Caller: Unspecified (Today,  8:15 AM)             If 1/2 tab is not working, he may take a full tablet of 5mg.

## 2018-01-01 ENCOUNTER — APPOINTMENT (OUTPATIENT)
Dept: RADIOLOGY | Facility: MEDICAL CENTER | Age: 83
End: 2018-01-01
Attending: INTERNAL MEDICINE
Payer: MEDICARE

## 2018-01-01 ENCOUNTER — PATIENT MESSAGE (OUTPATIENT)
Dept: MEDICAL GROUP | Age: 83
End: 2018-01-01

## 2018-01-01 ENCOUNTER — HOSPITAL ENCOUNTER (OUTPATIENT)
Dept: RADIOLOGY | Facility: MEDICAL CENTER | Age: 83
End: 2018-03-07
Attending: INTERNAL MEDICINE
Payer: MEDICARE

## 2018-01-01 ENCOUNTER — HOSPITAL ENCOUNTER (OUTPATIENT)
Dept: LAB | Facility: MEDICAL CENTER | Age: 83
End: 2018-03-05
Attending: FAMILY MEDICINE
Payer: MEDICARE

## 2018-01-01 ENCOUNTER — PATIENT OUTREACH (OUTPATIENT)
Dept: HEALTH INFORMATION MANAGEMENT | Facility: OTHER | Age: 83
End: 2018-01-01

## 2018-01-01 ENCOUNTER — OFFICE VISIT (OUTPATIENT)
Dept: MEDICAL GROUP | Age: 83
End: 2018-01-01
Payer: MEDICARE

## 2018-01-01 VITALS
HEART RATE: 84 BPM | BODY MASS INDEX: 24.43 KG/M2 | WEIGHT: 146.6 LBS | DIASTOLIC BLOOD PRESSURE: 72 MMHG | TEMPERATURE: 97.2 F | HEIGHT: 65 IN | SYSTOLIC BLOOD PRESSURE: 138 MMHG | OXYGEN SATURATION: 93 %

## 2018-01-01 DIAGNOSIS — C34.91 PRIMARY LUNG CANCER WITH METASTASIS FROM LUNG TO OTHER SITE, RIGHT (HCC): ICD-10-CM

## 2018-01-01 DIAGNOSIS — G25.81 RLS (RESTLESS LEGS SYNDROME): ICD-10-CM

## 2018-01-01 DIAGNOSIS — C34.91 MALIGNANT NEOPLASM OF RIGHT LUNG, UNSPECIFIED PART OF LUNG (HCC): ICD-10-CM

## 2018-01-01 DIAGNOSIS — K21.9 GASTROESOPHAGEAL REFLUX DISEASE WITHOUT ESOPHAGITIS: ICD-10-CM

## 2018-01-01 DIAGNOSIS — I10 ESSENTIAL HYPERTENSION: ICD-10-CM

## 2018-01-01 DIAGNOSIS — I70.0 ATHEROSCLEROSIS OF ABDOMINAL AORTA (HCC): ICD-10-CM

## 2018-01-01 DIAGNOSIS — I27.20 PULMONARY HYPERTENSION (HCC): ICD-10-CM

## 2018-01-01 DIAGNOSIS — F01.50 VASCULAR DEMENTIA WITHOUT BEHAVIORAL DISTURBANCE (HCC): ICD-10-CM

## 2018-01-01 DIAGNOSIS — I10 HTN (HYPERTENSION), BENIGN: ICD-10-CM

## 2018-01-01 DIAGNOSIS — C34.00 MALIGNANT NEOPLASM OF HILUS OF LUNG, UNSPECIFIED LATERALITY (HCC): ICD-10-CM

## 2018-01-01 DIAGNOSIS — N40.0 BENIGN PROSTATIC HYPERPLASIA WITHOUT LOWER URINARY TRACT SYMPTOMS: ICD-10-CM

## 2018-01-01 DIAGNOSIS — Z00.00 MEDICARE ANNUAL WELLNESS VISIT, INITIAL: ICD-10-CM

## 2018-01-01 DIAGNOSIS — F02.80 DEMENTIA ASSOCIATED WITH OTHER UNDERLYING DISEASE WITHOUT BEHAVIORAL DISTURBANCE (HCC): ICD-10-CM

## 2018-01-01 DIAGNOSIS — M15.9 PRIMARY OSTEOARTHRITIS INVOLVING MULTIPLE JOINTS: ICD-10-CM

## 2018-01-01 LAB
ALBUMIN SERPL BCP-MCNC: 4 G/DL (ref 3.2–4.9)
ALBUMIN/GLOB SERPL: 1.4 G/DL
ALP SERPL-CCNC: 59 U/L (ref 30–99)
ALT SERPL-CCNC: 31 U/L (ref 2–50)
ANION GAP SERPL CALC-SCNC: 8 MMOL/L (ref 0–11.9)
AST SERPL-CCNC: 33 U/L (ref 12–45)
BASOPHILS # BLD AUTO: 0.5 % (ref 0–1.8)
BASOPHILS # BLD: 0.03 K/UL (ref 0–0.12)
BILIRUB SERPL-MCNC: 0.9 MG/DL (ref 0.1–1.5)
BUN SERPL-MCNC: 20 MG/DL (ref 8–22)
CALCIUM SERPL-MCNC: 9.6 MG/DL (ref 8.5–10.5)
CHLORIDE SERPL-SCNC: 105 MMOL/L (ref 96–112)
CHOLEST SERPL-MCNC: 161 MG/DL (ref 100–199)
CO2 SERPL-SCNC: 27 MMOL/L (ref 20–33)
CREAT SERPL-MCNC: 1.05 MG/DL (ref 0.5–1.4)
EOSINOPHIL # BLD AUTO: 0.02 K/UL (ref 0–0.51)
EOSINOPHIL NFR BLD: 0.3 % (ref 0–6.9)
ERYTHROCYTE [DISTWIDTH] IN BLOOD BY AUTOMATED COUNT: 42.9 FL (ref 35.9–50)
GLOBULIN SER CALC-MCNC: 2.9 G/DL (ref 1.9–3.5)
GLUCOSE SERPL-MCNC: 86 MG/DL (ref 65–99)
HCT VFR BLD AUTO: 38.5 % (ref 42–52)
HDLC SERPL-MCNC: 73 MG/DL
HGB BLD-MCNC: 12.4 G/DL (ref 14–18)
IMM GRANULOCYTES # BLD AUTO: 0.12 K/UL (ref 0–0.11)
IMM GRANULOCYTES NFR BLD AUTO: 1.9 % (ref 0–0.9)
LDLC SERPL CALC-MCNC: 73 MG/DL
LYMPHOCYTES # BLD AUTO: 1.2 K/UL (ref 1–4.8)
LYMPHOCYTES NFR BLD: 19.3 % (ref 22–41)
MCH RBC QN AUTO: 27.6 PG (ref 27–33)
MCHC RBC AUTO-ENTMCNC: 32.2 G/DL (ref 33.7–35.3)
MCV RBC AUTO: 85.6 FL (ref 81.4–97.8)
MONOCYTES # BLD AUTO: 0.75 K/UL (ref 0–0.85)
MONOCYTES NFR BLD AUTO: 12.1 % (ref 0–13.4)
NEUTROPHILS # BLD AUTO: 4.1 K/UL (ref 1.82–7.42)
NEUTROPHILS NFR BLD: 65.9 % (ref 44–72)
NRBC # BLD AUTO: 0 K/UL
NRBC BLD-RTO: 0 /100 WBC
PLATELET # BLD AUTO: 163 K/UL (ref 164–446)
PMV BLD AUTO: 11.6 FL (ref 9–12.9)
POTASSIUM SERPL-SCNC: 3.9 MMOL/L (ref 3.6–5.5)
PROT SERPL-MCNC: 6.9 G/DL (ref 6–8.2)
PSA SERPL-MCNC: 1.79 NG/ML (ref 0–4)
RBC # BLD AUTO: 4.5 M/UL (ref 4.7–6.1)
SODIUM SERPL-SCNC: 140 MMOL/L (ref 135–145)
TRIGL SERPL-MCNC: 76 MG/DL (ref 0–149)
WBC # BLD AUTO: 6.2 K/UL (ref 4.8–10.8)

## 2018-01-01 PROCEDURE — 85025 COMPLETE CBC W/AUTO DIFF WBC: CPT

## 2018-01-01 PROCEDURE — G0439 PPPS, SUBSEQ VISIT: HCPCS | Performed by: FAMILY MEDICINE

## 2018-01-01 PROCEDURE — 84153 ASSAY OF PSA TOTAL: CPT

## 2018-01-01 PROCEDURE — 700117 HCHG RX CONTRAST REV CODE 255: Performed by: INTERNAL MEDICINE

## 2018-01-01 PROCEDURE — 74160 CT ABDOMEN W/CONTRAST: CPT

## 2018-01-01 PROCEDURE — 80053 COMPREHEN METABOLIC PANEL: CPT

## 2018-01-01 PROCEDURE — 80061 LIPID PANEL: CPT

## 2018-01-01 PROCEDURE — 36415 COLL VENOUS BLD VENIPUNCTURE: CPT

## 2018-01-01 RX ORDER — ROPINIROLE 1 MG/1
TABLET, FILM COATED ORAL
Qty: 270 TAB | Refills: 0 | Status: SHIPPED | OUTPATIENT
Start: 2018-01-01

## 2018-01-01 RX ORDER — MEMANTINE HYDROCHLORIDE 5 MG/1
TABLET ORAL
Qty: 90 TAB | Refills: 0 | Status: SHIPPED | OUTPATIENT
Start: 2018-01-01

## 2018-01-01 RX ORDER — EZETIMIBE 10 MG/1
TABLET ORAL
Qty: 90 TAB | Refills: 0 | Status: SHIPPED | OUTPATIENT
Start: 2018-01-01

## 2018-01-01 RX ORDER — HYDROCHLOROTHIAZIDE 12.5 MG/1
CAPSULE, GELATIN COATED ORAL
Qty: 90 CAP | Refills: 0 | Status: SHIPPED | OUTPATIENT
Start: 2018-01-01

## 2018-01-01 RX ORDER — DOXYCYCLINE HYCLATE 100 MG
TABLET ORAL
Qty: 180 TAB | Refills: 0 | Status: SHIPPED | OUTPATIENT
Start: 2018-01-01

## 2018-01-01 RX ORDER — GABAPENTIN 300 MG/1
CAPSULE ORAL
Qty: 270 CAP | Refills: 0 | Status: SHIPPED | OUTPATIENT
Start: 2018-01-01

## 2018-01-01 RX ORDER — DILTIAZEM HYDROCHLORIDE 180 MG/1
CAPSULE, EXTENDED RELEASE ORAL
Qty: 30 CAP | Refills: 0 | Status: SHIPPED | OUTPATIENT
Start: 2018-01-01

## 2018-01-01 RX ADMIN — IOHEXOL 50 ML: 240 INJECTION, SOLUTION INTRATHECAL; INTRAVASCULAR; INTRAVENOUS; ORAL at 11:06

## 2018-01-01 RX ADMIN — IOHEXOL 100 ML: 350 INJECTION, SOLUTION INTRAVENOUS at 11:06

## 2018-01-01 ASSESSMENT — ACTIVITIES OF DAILY LIVING (ADL): BATHING_REQUIRES_ASSISTANCE: 0

## 2018-01-01 ASSESSMENT — PAIN SCALES - GENERAL: PAINLEVEL: NO PAIN

## 2018-01-01 ASSESSMENT — PATIENT HEALTH QUESTIONNAIRE - PHQ9: CLINICAL INTERPRETATION OF PHQ2 SCORE: 0

## 2018-02-13 NOTE — PROGRESS NOTES
1. Attempt #: 1 DID NOT SPEAK DIRECTLY TO PT    2. [facility] Verified: yes    3. Verify PCP: no    4. Care Team Updated:       •   DME Company (gait device, O2, CPAP, etc.): N\A       •   Other Specialists (eye doctor, derm, GYN, cardiology, endo, etc): N\A    5.  Reviewed/Updated the following with patient:       •   Communication Preference Obtained? NO       •   Preferred Pharmacy? NO       •   Preferred Lab? NO       •   Family History (document living status of immediate family members and if + hx of cancer, diabetes, hypertension, hyperlipidemia, heart attack, stroke) NO    6. Integrated Medical Management Activation: already active    7. Integrated Medical Management Yaima: no    8. Annual Wellness Visit Scheduling  Scheduling Status:Scheduled      9. Care Gap Scheduling (Attempt to Schedule EACH Overdue Care Gap!)     Health Maintenance Due   Topic Date Due   • Annual Wellness Visit  03/15/2017        Scheduled patient for Annual Wellness Visit    10. Patient was advised: “This is a free wellness visit. The provider will screen for medical conditions to help you stay healthy. If you have other concerns to address you may be asked to discuss these at a separate visit or there may be an additional fee.”     11. Patient was informed to arrive 15 min prior to their scheduled appointment and bring in their medication bottles.

## 2018-03-13 PROBLEM — I27.20 PULMONARY HYPERTENSION (HCC): Status: ACTIVE | Noted: 2018-01-01

## 2018-03-13 PROBLEM — I70.0 ATHEROSCLEROSIS OF ABDOMINAL AORTA (HCC): Status: ACTIVE | Noted: 2018-01-01

## 2018-03-13 PROBLEM — Z00.00 MEDICARE ANNUAL WELLNESS VISIT, INITIAL: Status: ACTIVE | Noted: 2018-01-01

## 2018-03-13 NOTE — PROGRESS NOTES
Chief Complaint   Patient presents with   • Annual Wellness Visit     SCP         HPI:  Benjie is a 87 y.o. here for Medicare Annual Wellness Visit        Patient Active Problem List    Diagnosis Date Noted   • Primary pulmonary hypertension (CMS-HCC) 05/04/2015     Priority: High   • Atherosclerosis of abdominal aorta (CMS-HCC) 03/13/2018   • Pulmonary hypertension 03/13/2018   • Medicare annual wellness visit, initial 03/13/2018   • Need for vaccination 10/19/2017   • Hypotension due to drugs 06/15/2017   • Bronchitis 04/17/2017   • Preventative health care 04/17/2017   • Cough 01/10/2017   • Acute nasopharyngitis 12/02/2016   • Anemia 09/22/2016   • Hypocalcemia 09/22/2016   • ZANDER (acute kidney injury) (CMS-HCC) 09/22/2016   • Dementia 09/10/2016   • SBO (small bowel obstruction) 09/09/2016   • GIB (gastrointestinal bleeding) 09/08/2016   • Left hip pain 06/27/2016   • Seborrheic dermatitis of scalp 06/27/2016   • Rash on both feet 06/27/2016   • Secondary corneal edema 04/05/2016   • Cramp in lower leg 02/24/2016   • Normocytic anemia 02/24/2016   • Erosive gastritis 12/10/2015   • Gastroesophageal reflux disease without esophagitis 12/02/2015   • Chronic rhinitis 12/02/2015   • Weight loss 12/02/2015   • Lung cancer (CMS-HCC) 04/02/2015   • Solitary pulmonary nodule 03/13/2015   • RLS (restless legs syndrome) 03/02/2015   • Pulmonary nodule 02/22/2015   • HTN (hypertension), benign    • Mild cognitive impairment    • Hyperlipidemia    • DJD (degenerative joint disease)    • Paresthesias        Current Outpatient Prescriptions   Medication Sig Dispense Refill   • diltiazem (DILACOR XR) 180 MG XR capsule TAKE ONE CAPSULE BY MOUTH ONCE DAILY 30 Cap 0   • doxycycline (VIBRAMYCIN) 100 MG Tab Take one tab po q24h 2 days before travel and continue for 4wks after return 180 Tab 0   • ROPINIRole (REQUIP) 1 MG Tab TAKE THREE TABLETS BY MOUTH AT BEDTIME 270 Tab 0   • gabapentin (NEURONTIN) 300 MG Cap TAKE ONE CAPSULE BY  MOUTH THREE TIMES DAILY 270 Cap 0   • memantine (NAMENDA) 5 MG Tab TAKE ONE TABLET BY MOUTH ONCE DAILY 90 Tab 0   • ezetimibe (ZETIA) 10 MG Tab TAKE ONE TABLET BY MOUTH ONCE DAILY 90 Tab 0   • diazepam (VALIUM) 5 MG Tab TAKE ONE-HALF TABLET BY MOUTH EVERY 24 HOURS AS NEEDED FOR  CRAMPS 30 Tab 0   • polyethylene glycol/lytes (MIRALAX) Pack DISSOLVE ONE PACKET IN WATER & DRINK ONCE DAILY AS NEEDED FOR CONSTIPATION 90 Each 0   • pantoprazole (PROTONIX) 40 MG Tablet Delayed Response TAKE ONE TABLET BY MOUTH ONCE DAILY 90 Tab 3   • fluticasone (FLONASE) 50 MCG/ACT nasal spray USE TWO SPRAY(S) IN EACH NOSTRIL TWICE DAILY 1 Bottle 0   • clobetasol (TEMOVATE) 0.05 % Cream      • TARCEVA 25 MG Tab      • timolol (TIMOPTIC) 0.5 % Solution      • clotrimazole-betamethasone (LOTRISONE) 1-0.05 % Cream Apply 1 Application to affected area(s) 2 times a day.     • Erlotinib HCl (TARCEVA) 100 MG Tab Take 100 mg by mouth every evening.     • tamsulosin (FLOMAX) 0.4 MG capsule Take 0.4 mg by mouth ONE-HALF HOUR AFTER BREAKFAST.     • clindamycin (CLEOCIN T) 1 % Gel Apply 1 Application to affected area(s) 2 times a day.     • ferrous sulfate 325 (65 FE) MG tablet Take 325 mg by mouth every day.     • latanoprost (XALATAN) 0.005 % Solution Place 1 Drop in both eyes every evening.     • Multiple Vitamins-Minerals (MULTIVITAMIN PO) Take 1 Tab by mouth every day.     • vitamin D (CHOLECALCIFEROL) 1000 UNIT TABS Take 1,000 Units by mouth every day.     • hydrochlorothiazide (MICROZIDE) 12.5 MG capsule TAKE ONE CAPSULE BY MOUTH ONCE DAILY 90 Cap 0   • CARTIA  MG CAPSULE SR 24 HR TAKE ONE CAPSULE BY MOUTH ONCE DAILY (Patient not taking: Reported on 3/13/2018) 90 Cap 0   • acetaminophen-codeine #3 (TYLENOL #3) 300-30 MG Tab Take 1-2 Tabs by mouth every 24 hours. (Patient not taking: Reported on 3/13/2018) 90 Tab 0   • albuterol 108 (90 BASE) MCG/ACT Aero Soln inhalation aerosol Inhale 2 Puffs by mouth every 6 hours as needed for  Shortness of Breath. 8.5 g 3   • benzonatate (TESSALON) 100 MG Cap Take 1 Cap by mouth 3 times a day as needed for Cough. (Patient not taking: Reported on 6/15/2017) 60 Cap 3   • chlorhexidine (PERIDEX) 0.12 % Solution Take 15 mL by mouth 2 times a day. (Patient not taking: Reported on 3/13/2018) 946 mL 0   • celecoxib (CELEBREX) 200 MG Cap Take 200 mg by mouth 1 time daily as needed for Moderate Pain.     • hydrocodone-acetaminophen (NORCO) 5-325 MG Tab per tablet Take 1-2 Tabs by mouth every four hours as needed. Indications: Moderate to Moderately Severe Pain       No current facility-administered medications for this visit.         Patient is taking medications as noted in medication list.  Current supplements as per medication list.     Allergies: Lipitor [atorvastatin calcium]    Current social contact/activities: nothing   Patient's perception of their health: good    Is patient current with immunizations? Yes.    He  reports that he has never smoked. He has never used smokeless tobacco. He reports that he does not drink alcohol or use drugs.  Counseling given: Not Answered        DPA/Advanced directive: Patient does not have an Advanced Directive.  A packet and workshop information was given on Advanced Directives.    ROS:    Gait: Uses a cane   Ostomy: no   Other tubes: no   Amputations: no   Chronic oxygen use no   Last eye exam 12/2017   Wears hearing aids: yes   : Denies any urinary leakage during the last 6 months      Screening:        Depression Screening    Little interest or pleasure in doing things?  0 - not at all  Feeling down, depressed, or hopeless? 0 - not at all  Patient Health Questionnaire Score: 0    If depressive symptoms identified deferred to follow up visit unless specifically addressed in assessment and plan.    Interpretation of PHQ-9 Total Score   Score Severity   1-4 No Depression   5-9 Mild Depression   10-14 Moderate Depression   15-19 Moderately Severe Depression   20-27  Severe Depression    Screening for Cognitive Impairment    Three Minute Recall (apple, watch, morro)  1/3 Table leader sunset  1/3  Draw clock face with all 12 numbers set to the hand to show 10 minutes past 11 o'clock  0 Spacing/time  3/5  If cognitive concerns identified, deferred for follow up unless specifically addressed in assessment and plan.    Fall Risk Assessment    Has the patient had two or more falls in the last year or any fall with injury in the last year?  No  If fall risk identified, deferred for follow up unless specifically addressed in assessment and plan.    Safety Assessment    Throw rugs on floor.  Yes  Handrails on all stairs.  Yes  Good lighting in all hallways.  Yes  Difficulty hearing.  No  Patient counseled about all safety risks that were identified.    Functional Assessment ADLs    Are there any barriers preventing you from cooking for yourself or meeting nutritional needs?  No.    Are there any barriers preventing you from driving safely or obtaining transportation?  Yes. Patient can not drive. Daughter drives  Are there any barriers preventing you from using a telephone or calling for help?  No.    Are there any barriers preventing you from shopping?  No.    Are there any barriers preventing you from taking care of your own finances?  Yes. Patient can not see very well. Daughter does finances  Are there any barriers preventing you from managing your medications?  No.    Are there any barriers preventing you from showering, bathing or dressing yourself?  No.    Are you currently engaging any exercise or physical activity?  Yes.  Yoga/streaching     Health Maintenance Summary                Annual Wellness Visit Overdue 3/15/2017      Done 3/14/2016 Visit Dx: Medicare annual wellness visit, subsequent     Patient has more history with this topic...    IMM DTaP/Tdap/Td Vaccine Next Due 10/8/2025      Done 10/8/2015 Imm Admin: Tdap Vaccine          Patient Care Team:  Jose Alberto Garza  "ELISSA Sterling as PCP - General (Family Medicine)  Fadi Najjar, M.D. as Consulting Physician (Nephrology)  Sebastian Franklin M.D. as Consulting Physician (Oncology)  Cheng Lynne M.D. as Consulting Physician (Phys Med and Rehab)  Shahram Dias M.D. as Consulting Physician (Gastroenterology)  Jez Ball M.D. as Consulting Physician (Ophthalmology)  Jan Maya M.D. as Consulting Physician (Ophthalmology)  Jia Galvan M.D. as Consulting Physician (Ophthalmology)    Social History   Substance Use Topics   • Smoking status: Never Smoker   • Smokeless tobacco: Never Used   • Alcohol use No     Family History   Problem Relation Age of Onset   • Cancer Daughter 49     Breast     He  has a past medical history of Anxiety; Arthritis; Cancer (CMS-HCC) (2015); Carcinoma in situ of respiratory system; Dementia; Depression; DJD (degenerative joint disease); Glaucoma; Heart burn; HTN (hypertension), benign; Hyperlipidemia; Hypertension; Indigestion; MEDICAL HOME (11/8/2012); Mild cognitive impairment; Paresthesias; and Unspecified cataract.   Past Surgical History:   Procedure Laterality Date   • PENETRATING KERATOPLASTY Left 4/5/2016    Procedure: DESCEMETS STRIPPING ENDOTHELIAL KERATOPLASTY;  Surgeon: Jabari Rajput M.D.;  Location: SURGERY [facility] ORS;  Service:    • COLONOSCOPY - ENDO N/A 2/18/2016    Procedure: COLONOSCOPY - ENDO;  Surgeon: Shahram Dias M.D.;  Location: SURGERY [facility] ORS;  Service:    • RECOVERY  3/13/2015    Performed by [facility] at SURGERY SAME DAY [facility] ORS   • HEMORRHOIDECTOMY  5/2/08    Performed by GENEVIEVE RIBEIRO at SURGERY SAME DAY [facility] ORS   • OTHER Left 1999    left toe   • CATARACT EXTRACTION Bilateral 1998    bilateral   • PROSTATECTOMY, RADIAL  1990's   • PROSTATECTOMY, RADICAL RETRO             Exam:     Blood pressure 138/72, pulse 84, temperature 36.2 °C (97.2 °F), height 1.651 m (5' 5\"), weight 66.5 kg (146 lb 9.6 oz), SpO2 93 %. " Body mass index is 24.4 kg/m².    Hearing fair.    Dentition fair  Alert, oriented in no acute distress.  Eye contact is good, speech goal directed, affect calm      Assessment and Plan. The following treatment and monitoring plan is recommended:    1. Atherosclerosis of abdominal aorta (CMS-HCC)  Patient has been stable with current management  We will make no changes for now     2. Malignant neoplasm of right lung, unspecified part of lung (CMS-HCC)  Patient has been stable with current management  We will make no changes for now     3. Pulmonary hypertension  Patient has been stable with current management  We will make no changes for now     4. Medicare annual visit completed         Services suggested: No services needed at this time  Health Care Screening: Age-appropriate preventive services recommended by USPTF and ACIP covered by Medicare were discussed today. Services ordered if indicated and agreed upon by the patient.  Referrals offered: PT/OT/Nutrition counseling/Behavioral Health/Smoking cessation as per orders if indicated.    Discussion today about general wellness and lifestyle habits:    · Prevent falls and reduce trip hazards; Cautioned about securing or removing rugs.  · Have a working fire alarm and carbon monoxide detector;   · Engage in regular physical activity and social activities       Follow-up: Return in about 6 months (around 9/13/2018) for Reevaluation.

## 2021-01-11 DIAGNOSIS — Z23 NEED FOR VACCINATION: ICD-10-CM
